# Patient Record
Sex: MALE | Race: WHITE | NOT HISPANIC OR LATINO | Employment: FULL TIME | URBAN - METROPOLITAN AREA
[De-identification: names, ages, dates, MRNs, and addresses within clinical notes are randomized per-mention and may not be internally consistent; named-entity substitution may affect disease eponyms.]

---

## 2017-01-31 DIAGNOSIS — I21.11 ST ELEVATION MYOCARDIAL INFARCTION INVOLVING RIGHT CORONARY ARTERY (H): Primary | ICD-10-CM

## 2017-01-31 RX ORDER — PRASUGREL 10 MG/1
10 TABLET, FILM COATED ORAL DAILY
Qty: 90 TABLET | Refills: 2 | Status: SHIPPED | OUTPATIENT
Start: 2017-01-31 | End: 2017-04-28

## 2017-03-02 DIAGNOSIS — I21.11 ST ELEVATION MYOCARDIAL INFARCTION INVOLVING RIGHT CORONARY ARTERY (H): ICD-10-CM

## 2017-03-02 DIAGNOSIS — I24.9 ACS (ACUTE CORONARY SYNDROME) (H): ICD-10-CM

## 2017-03-02 RX ORDER — METOPROLOL SUCCINATE 50 MG/1
50 TABLET, EXTENDED RELEASE ORAL DAILY
Qty: 90 TABLET | Refills: 3 | Status: SHIPPED | OUTPATIENT
Start: 2017-03-02 | End: 2017-04-28

## 2017-03-03 ENCOUNTER — TELEPHONE (OUTPATIENT)
Dept: CARDIOLOGY | Facility: CLINIC | Age: 52
End: 2017-03-03

## 2017-03-03 NOTE — TELEPHONE ENCOUNTER
Recieved call from  requesting lipids last lipids on record was from   December 2015  Left detailed msg most recent BMP and Lipids given 9-

## 2017-03-30 ENCOUNTER — TELEPHONE (OUTPATIENT)
Dept: CARDIOLOGY | Facility: CLINIC | Age: 52
End: 2017-03-30

## 2017-03-30 NOTE — TELEPHONE ENCOUNTER
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 Patient will call DOT to check what he needs and will call back to have orders set up. Patient states his angina is about the same, comes and goes.  Will update Dr. Palmer for approval to order DOT stress test per their protocol

## 2017-04-07 NOTE — TELEPHONE ENCOUNTER
"Spoke with patient, he did talk with the DOT but all he was told was that he needs \"something with an ejection fraction\". He will call again and try to get a more specific answer.  "

## 2017-04-10 ENCOUNTER — TELEPHONE (OUTPATIENT)
Dept: CARDIOLOGY | Facility: CLINIC | Age: 52
End: 2017-04-10

## 2017-04-10 DIAGNOSIS — I25.10 CORONARY ARTERY DISEASE INVOLVING NATIVE CORONARY ARTERY OF NATIVE HEART WITHOUT ANGINA PECTORIS: Primary | ICD-10-CM

## 2017-04-10 NOTE — TELEPHONE ENCOUNTER
Patient called, Per patient after speaking with DOT, he will need a stress echocardiogram. And then a cardiologist OV for DOT clearance.  Will message Dr. Palmer if BB hold needed prior to test?

## 2017-04-24 ENCOUNTER — TELEPHONE (OUTPATIENT)
Dept: CARDIOLOGY | Facility: CLINIC | Age: 52
End: 2017-04-24

## 2017-04-24 NOTE — TELEPHONE ENCOUNTER
Patient called to verify the stress test and appointment scheduled for this week. Appointment times and date verified. All questions answered. Patient verbalized understanding of test and appointment.,

## 2017-04-25 ENCOUNTER — HOSPITAL ENCOUNTER (OUTPATIENT)
Dept: CARDIOLOGY | Facility: CLINIC | Age: 52
Discharge: HOME | End: 2017-04-25
Attending: INTERNAL MEDICINE | Admitting: INTERNAL MEDICINE
Payer: COMMERCIAL

## 2017-04-25 DIAGNOSIS — I25.10 CORONARY ARTERY DISEASE INVOLVING NATIVE CORONARY ARTERY OF NATIVE HEART WITHOUT ANGINA PECTORIS: Chronic | ICD-10-CM

## 2017-04-25 DIAGNOSIS — I25.10 CORONARY ARTERY DISEASE INVOLVING NATIVE CORONARY ARTERY OF NATIVE HEART WITHOUT ANGINA PECTORIS: ICD-10-CM

## 2017-04-25 LAB
ANION GAP SERPL CALCULATED.3IONS-SCNC: 5.2 MMOL/L (ref 6–17)
BUN SERPL-MCNC: 11 MG/DL (ref 7–30)
CALCIUM SERPL-MCNC: 9.4 MG/DL (ref 8.5–10.5)
CHLORIDE SERPL-SCNC: 100 MMOL/L (ref 98–107)
CHOLEST SERPL-MCNC: 139 MG/DL
CO2 SERPL-SCNC: 29 MMOL/L (ref 23–29)
CREAT SERPL-MCNC: 0.98 MG/DL (ref 0.7–1.3)
GFR SERPL CREATININE-BSD FRML MDRD: 80 ML/MIN/1.7M2
GLUCOSE SERPL-MCNC: 118 MG/DL (ref 70–105)
HDLC SERPL-MCNC: 30 MG/DL
LDLC SERPL CALC-MCNC: 63 MG/DL
NONHDLC SERPL-MCNC: 109 MG/DL
POTASSIUM SERPL-SCNC: 4.2 MMOL/L (ref 3.5–5.1)
SODIUM SERPL-SCNC: 139 MMOL/L (ref 133–144)
TRIGL SERPL-MCNC: 231 MG/DL

## 2017-04-25 PROCEDURE — 80048 BASIC METABOLIC PNL TOTAL CA: CPT | Performed by: INTERNAL MEDICINE

## 2017-04-25 PROCEDURE — 80061 LIPID PANEL: CPT | Performed by: INTERNAL MEDICINE

## 2017-04-25 PROCEDURE — 93018 CV STRESS TEST I&R ONLY: CPT | Performed by: INTERNAL MEDICINE

## 2017-04-25 PROCEDURE — 93016 CV STRESS TEST SUPVJ ONLY: CPT | Performed by: INTERNAL MEDICINE

## 2017-04-25 PROCEDURE — 25500064 ZZH RX 255 OP 636: Performed by: INTERNAL MEDICINE

## 2017-04-25 PROCEDURE — 93325 DOPPLER ECHO COLOR FLOW MAPG: CPT | Mod: 26 | Performed by: INTERNAL MEDICINE

## 2017-04-25 PROCEDURE — 40000264 ECHO STRESS TEST WITH LUMASON

## 2017-04-25 PROCEDURE — 36415 COLL VENOUS BLD VENIPUNCTURE: CPT | Performed by: INTERNAL MEDICINE

## 2017-04-25 PROCEDURE — 93350 STRESS TTE ONLY: CPT | Mod: 26 | Performed by: INTERNAL MEDICINE

## 2017-04-25 PROCEDURE — 93321 DOPPLER ECHO F-UP/LMTD STD: CPT | Mod: 26 | Performed by: INTERNAL MEDICINE

## 2017-04-25 RX ADMIN — SULFUR HEXAFLUORIDE 5 ML: KIT at 10:30

## 2017-04-28 ENCOUNTER — OFFICE VISIT (OUTPATIENT)
Dept: CARDIOLOGY | Facility: CLINIC | Age: 52
End: 2017-04-28
Attending: INTERNAL MEDICINE
Payer: COMMERCIAL

## 2017-04-28 ENCOUNTER — DOCUMENTATION ONLY (OUTPATIENT)
Dept: CARDIOLOGY | Facility: CLINIC | Age: 52
End: 2017-04-28

## 2017-04-28 VITALS
DIASTOLIC BLOOD PRESSURE: 82 MMHG | HEART RATE: 72 BPM | SYSTOLIC BLOOD PRESSURE: 124 MMHG | WEIGHT: 210 LBS | HEIGHT: 73 IN | BODY MASS INDEX: 27.83 KG/M2

## 2017-04-28 DIAGNOSIS — I21.02 ST ELEVATION MYOCARDIAL INFARCTION INVOLVING LEFT ANTERIOR DESCENDING (LAD) CORONARY ARTERY (H): ICD-10-CM

## 2017-04-28 DIAGNOSIS — I21.11 ST ELEVATION MYOCARDIAL INFARCTION INVOLVING RIGHT CORONARY ARTERY (H): ICD-10-CM

## 2017-04-28 DIAGNOSIS — E78.5 HYPERLIPIDEMIA WITH TARGET LDL LESS THAN 70: ICD-10-CM

## 2017-04-28 DIAGNOSIS — E78.2 MIXED HYPERLIPIDEMIA: Primary | Chronic | ICD-10-CM

## 2017-04-28 DIAGNOSIS — I24.9 ACS (ACUTE CORONARY SYNDROME) (H): ICD-10-CM

## 2017-04-28 DIAGNOSIS — I10 BENIGN ESSENTIAL HYPERTENSION: ICD-10-CM

## 2017-04-28 DIAGNOSIS — I25.10 CORONARY ARTERY DISEASE INVOLVING NATIVE CORONARY ARTERY OF NATIVE HEART WITHOUT ANGINA PECTORIS: Chronic | ICD-10-CM

## 2017-04-28 PROCEDURE — 99214 OFFICE O/P EST MOD 30 MIN: CPT | Performed by: INTERNAL MEDICINE

## 2017-04-28 RX ORDER — METOPROLOL SUCCINATE 50 MG/1
50 TABLET, EXTENDED RELEASE ORAL DAILY
Qty: 90 TABLET | Refills: 3 | Status: SHIPPED | OUTPATIENT
Start: 2017-04-28 | End: 2018-05-23

## 2017-04-28 RX ORDER — NITROGLYCERIN 0.4 MG/1
0.4 TABLET SUBLINGUAL EVERY 5 MIN PRN
Qty: 25 TABLET | Refills: 0 | Status: SHIPPED | OUTPATIENT
Start: 2017-04-28 | End: 2018-06-18

## 2017-04-28 RX ORDER — ATORVASTATIN CALCIUM 40 MG/1
40 TABLET, FILM COATED ORAL DAILY
Qty: 90 TABLET | Refills: 3 | Status: SHIPPED | OUTPATIENT
Start: 2017-04-28 | End: 2018-05-01

## 2017-04-28 NOTE — MR AVS SNAPSHOT
After Visit Summary   4/28/2017    Ander Doyle    MRN: [id number]           Patient Information     Date Of Birth          1965        Visit Information        Provider Department      4/28/2017 11:00 AM Pawel Moreno MD [facility]        Today's Diagnoses     Mixed hyperlipidemia    -  1    Coronary artery disease involving native coronary artery of native heart without angina pectoris        ST elevation myocardial infarction involving left anterior descending (LAD) coronary artery (H)        Benign essential hypertension        ACS (acute coronary syndrome) (H)        ST elevation myocardial infarction involving right coronary artery (H)        Hyperlipidemia with target LDL less than 70           Follow-ups after your visit        Additional Services     Follow-Up with Cardiologist                 Future tests that were ordered for you today     Open Future Orders        Priority Expected Expires Ordered    Follow-Up with Cardiologist Routine 4/28/2018 9/10/2018 4/28/2017            Who to contact     If you have questions or need follow up information about today's clinic visit or your schedule please contact [facility] directly at [phone].  Normal or non-critical lab and imaging results will be communicated to you by eMazeMehart, letter or phone within 4 business days after the clinic has received the results. If you do not hear from us within 7 days, please contact the clinic through [employer] or phone. If you have a critical or abnormal lab result, we will notify you by phone as soon as possible.  Submit refill requests through Picanova or call your pharmacy and they will forward the refill request to us. Please allow 3 business days for your refill to be completed.          Additional Information About Your Visit        Picanova Information     Picanova lets you send messages to your doctor, view  "your test results, renew your prescriptions, schedule appointments and more. To sign up, go to [email or web address] . Click on \"Log in\" on the left side of the screen, which will take you to the Welcome page. Then click on \"Sign up Now\" on the right side of the page.     You will be asked to enter the access code listed below, as well as some personal information. Please follow the directions to create your username and password.     Your access code is: [email or web address]  Expires: 2017 11:47 AM     Your access code will  in 90 days. If you need help or a new code, please call your [facility] clinic or [phone].        Care EveryWhere ID     This is your Care EveryWhere ID. This could be used by other organizations to access your [facility] medical records  [id number]        Your Vitals Were     Pulse Height BMI (Body Mass Index)             72 1.854 m (6' 0.99\") 27.71 kg/m2          Blood Pressure from Last 3 Encounters:   17 124/82   09/15/16 112/64   16 110/80    Weight from Last 3 Encounters:   17 95.3 kg (210 lb)   09/15/16 97.1 kg (214 lb)   16 94.3 kg (208 lb)              We Performed the Following     Follow-Up with Cardiologist          Today's Medication Changes          These changes are accurate as of: 17 11:47 AM.  If you have any questions, ask your nurse or doctor.               Stop taking these medicines if you haven't already. Please contact your care team if you have questions.     prasugrel 10 MG Tabs tablet   Commonly known as:  EFFIENT   Stopped by:  Pawel Moreno MD                Where to get your medicines      These medications were sent to [facility]/pharmacy [address] [place], [place] - [address], [place] [address]     Phone:  [phone]     atorvastatin 40 MG tablet    metoprolol 50 MG 24 hr tablet    nitroglycerin 0.4 MG sublingual tablet                Primary Care Provider Office Phone # Fax #    Jose Yip MD " [phone]       [facility] [address]  [facility] [address]        Thank you!     Thank you for choosing [facility] [place]  for your care. Our goal is always to provide you with excellent care. Hearing back from our patients is one way we can continue to improve our services. Please take a few minutes to complete the written survey that you may receive in the mail after your visit with us. Thank you!             Your Updated Medication List - Protect others around you: Learn how to safely use, store and throw away your medicines at www.disposemymeds.org.          This list is accurate as of: 4/28/17 11:47 AM.  Always use your most recent med list.                   Brand Name Dispense Instructions for use    aspirin 81 MG chewable tablet     36 tablet    Take 1 tablet (81 mg) by mouth daily       atorvastatin 40 MG tablet    LIPITOR    90 tablet    Take 1 tablet (40 mg) by mouth daily       metoprolol 50 MG 24 hr tablet    TOPROL-XL    90 tablet    Take 1 tablet (50 mg) by mouth daily       nitroglycerin 0.4 MG sublingual tablet    NITROSTAT    25 tablet    Place 1 tablet (0.4 mg) under the tongue every 5 minutes as needed for chest pain       PANTOPRAZOLE SODIUM PO      Take by mouth daily       sucralfate 1 GM tablet    CARAFATE     Take by mouth 4 times daily as needed

## 2017-04-28 NOTE — PROGRESS NOTES
03/2015 when he presented with an inferior wall myocardial infarction. At that time, he had been smoking. 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 He has a history of hypertension, hypercholesterolemia, HDL deficiency, marked hypertriglyceridemia and borderline diabetes mellitus. In the Cath Lab, he was found to have severe disease involving his right coronary artery, circumflex and LAD artery. Aspiration thrombectomy and stenting of his right coronary artery was performed with distal embolism, a great deal of clot was removed, but there was some residual apical thrombus in one of his posterior lateral branches. Initially he had no reflow phenomenon which successfully responded to medications. He subsequently underwent circumflex coronary artery and OM treatment. He has a residual 80% mid LAD stenosis and an 80% first diagonal which was supposed to be staged and treated in a staged fashion; however, that night despite being on Integrilin and Brilinta he developed stent thrombosis. He was taken back to the Cath Lab where his right coronary artery was further dilated and stented with good result. He still, however, while in the hospital, developed recurrent chest pain. He was brought back to the Cath Lab, thinking that his LAD was probably the culprit, however, his right coronary artery had again thrombosed. Ultimately, his LAD was decided to be intervened on and no further attempts at his right coronary artery were performed. He underwent successful stenting of his LAD and diagonal. He participated in cardiac rehab. He had occasional throat discomfort which has been treated medically.         HPI and Plan:   See dictation  I recommend the following treatment or medication changes DC Effient.    No orders of the defined types were placed in this encounter.    No orders of the defined types were placed in this encounter.    There are no discontinued medications.      Encounter Diagnosis   Name Primary?      Coronary artery disease involving native coronary artery of native heart without angina pectoris        CURRENT MEDICATIONS:  Current Outpatient Prescriptions   Medication Sig Dispense Refill     metoprolol (TOPROL-XL) 50 MG 24 hr tablet Take 1 tablet (50 mg) by mouth daily 90 tablet 3     prasugrel (EFFIENT) 10 MG TABS tablet Take 1 tablet (10 mg) by mouth daily 90 tablet 2     atorvastatin (LIPITOR) 40 MG tablet Take 1 tablet (40 mg) by mouth daily 90 tablet 3     sucralfate (CARAFATE) 1 GM tablet Take by mouth 4 times daily as needed       PANTOPRAZOLE SODIUM PO Take by mouth daily       aspirin 81 MG chewable tablet Take 1 tablet (81 mg) by mouth daily 36 tablet 0     nitroglycerin (NITROSTAT) 0.4 MG SL tablet Place 1 tablet (0.4 mg) under the tongue every 5 minutes as needed for chest pain 25 tablet 0       ALLERGIES   No Known Allergies    PAST MEDICAL HISTORY:  Past Medical History:   Diagnosis Date     Angina pectoris (H)      Benign essential hypertension      CAD (coronary artery disease) 3/2015    cardiac cath 3/10/15: SHARONDA to LAD, SHARONDA to 1st diagonal, cardiac cath 3/7/15: BMS x2 to RCA, cardiac cath 3/6/15: SHARONDA to RCA, SHARONDA to circumflex, SHARONDA to OM     Hyperlipidemia LDL goal <70      STEMI (ST elevation myocardial infarction) (H)     inferior March 2015       PAST SURGICAL HISTORY:  Past Surgical History:   Procedure Laterality Date     HEART CATH STENT COR W/WO PTCA  3/6/2015    aspiration thrombectomy and PCI with BMS in prox and mid RCA, 80% LAD, CFX stents patent (prox CFX and OM branch)     HEART CATH STENT COR W/WO PTCA  3/7/2015    SHARONDA 1st diagonal and SHARONDA mid LAD       FAMILY HISTORY:  Family History   Problem Relation Age of Onset     Arrhythmia Father        SOCIAL HISTORY:  Social History     Social History     Marital status: Single     Spouse name: N/A     Number of children: N/A     Years of education: N/A     Social History Main Topics     Smoking status: Former Smoker     Packs/day: 1.50  "    Years: 30.00     Types: Cigarettes     Smokeless tobacco: None      Comment: quit 3/6/15 -  occas. has one     Alcohol use 0.0 oz/week     0 Standard drinks or equivalent per week      Comment: rare     Drug use: No     Sexual activity: Not Asked     Other Topics Concern     Caffeine Concern No     1 can of pop daily     Sleep Concern No     sometimes     Stress Concern No     Weight Concern No     Special Diet No     more lean meats, more greens     Exercise No     Social History Narrative         Review of Systems:  Skin:  Negative       Eyes:  Negative      ENT:  Negative   come and goes ,   Respiratory:  Negative       Cardiovascular:    Positive for;chest pain (occasional discomfort - 'feels different every time') very seldom   Gastroenterology: Positive for heartburn stomach discomfort ,   Genitourinary:  Negative      Musculoskeletal:  Negative   both feet - feel cold and hurt ,   Neurologic:  Negative   tingling in feet  Psychiatric:  Negative   Job related sleep disturbances  Heme/Lymph/Imm:  Negative      Endocrine:  Negative  (sweating)      Physical Exam:  Vitals: /82  Pulse 72  Ht 1.854 m (6' 0.99\")  Wt 95.3 kg (210 lb)  BMI 27.71 kg/m2    Constitutional:  cooperative, alert and oriented, well developed, well nourished, in no acute distress        Skin:  warm and dry to the touch        Head:  normocephalic, no masses or lesions        Eyes:  pupils equal and round;conjunctivae and lids unremarkable;sclera white;no xanthalasma;no nystagmus        ENT:  no pallor or cyanosis, dentition good        Neck:  no carotid bruit;carotid pulses are full and equal bilaterally        Chest:  clear to auscultation          Cardiac: regular rhythm;normal S1 and S2;no murmurs, gallops or rubs detected;no S3 or S4                  Abdomen:  abdomen soft;no masses;non-tender        Vascular: pulses full and equal                                   2+ bilateral radial pulses distal to catheter insertion " site, quick capillary refill    Extremities and Back:  no edema;no spinal abnormalities noted;normal muscle strength and tone              Neurological:  affect appropriate, oriented to time, person and place;no gross motor deficits          Recent Lab Results:  LIPID RESULTS:  Lab Results   Component Value Date    CHOL 139 04/25/2017    HDL 30 (L) 04/25/2017    LDL 63 04/25/2017    TRIG 231 (H) 04/25/2017    CHOLHDLRATIO 4.2 07/15/2015       LIVER ENZYME RESULTS:  Lab Results   Component Value Date    ALT 43 04/22/2015       CBC RESULTS:  Lab Results   Component Value Date    WBC 10.4 06/19/2015    RBC 5.20 06/19/2015    HGB 16.1 06/19/2015    HCT 46.5 06/19/2015    MCV 89 06/19/2015    MCH 31.0 06/19/2015    MCHC 34.6 06/19/2015    RDW 13.6 06/19/2015     06/19/2015       BMP RESULTS:  Lab Results   Component Value Date     04/25/2017    POTASSIUM 4.2 04/25/2017    CHLORIDE 100 04/25/2017    CO2 29 04/25/2017    ANIONGAP 5.2 (L) 04/25/2017     (H) 04/25/2017    BUN 11 04/25/2017    CR 0.98 04/25/2017    GFRESTIMATED 80 04/25/2017    GFRESTBLACK >90 04/25/2017    VOLODYMYR 9.4 04/25/2017        A1C RESULTS:  No results found for: A1C    INR RESULTS:  Lab Results   Component Value Date    INR 0.95 03/06/2015           CC  Claudio Palmer MD  [facility]  [address]  [place] [address]

## 2017-04-28 NOTE — LETTER
2017    Jose Yip MD  [facility]   [address]  [facility] [address]    RE: Ander Doyle       Dear Colleague,    I had the pleasure of seeing Ander AMADOR Vivek in the [facility].    I met Ander Bonillaolph today, who is 51.  At age 49 he had onset and documentation of coronary artery disease.  He ended up having stents to his RCA, LAD, diagonal branch and obtuse marginal.  Serially he had 3 coronary angiograms, and his last stenting intervention of the diagonal branch and the LAD revealed that the RCA stent and coronary artery were repeatedly thrombotically occluded.  It was decided to treat the culprit vessels of the left circulation and leave the occluded RCA alone.  He had collaterals to the inferior wall.  He had a tiny inferior wall infarct by stress nuclear study.      His main symptoms of myocardial ischemia were sweating.  He did not have classic pressure, pain, tightness, etc.  In the past year, he denies any cardiovascular symptoms to suggest an ischemic equivalent.  He denies chest pain, palpitations, dizziness, shortness of breath, orthopnea or lower leg edema.      He has abstained from smoking, to his credit.      He has taken the medication program includin.  Metoprolol succinate 50 mg a day.   2.  Atorvastatin 40 mg at bedtime.   3.  Aspirin 81 mg a day.   4.  Effient 10 mg a day.  It has been 2 years since his last stent intervention.      SOCIAL HISTORY:  He is single.  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  He does not smoke.  He does not drink.  He is active.  He likes to golf.      ALLERGIES:  None.      FAMILY HISTORY:  Negative for premature coronary artery disease.      REVIEW OF SYSTEMS:  As outlined in Epic.   HEENT:  Negative.   CARDIAC:  Negative for chest pain, dyspnea on exertion, palpitations, etc.  He has a vague history of abdominal discomfort but it does not sound anginal.   Musculoskeletal, GI, ,  neurologic, endocrine, etc., are all negative.      PHYSICAL EXAMINATION:   GENERAL:  Well-developed, well-nourished male weighing 210 pounds.   VITAL SIGNS:  Blood pressure 120/80, heart rate 70.  He had no neck vein distention or bruits.   HEART:  Regular without gallop or murmur.   LUNGS:  Clear.   ABDOMEN:  Soft without organomegaly, mass or pain.   EXTREMITIES:  Show +2 pedal pulses and no edema.      Per his DOT requirements, a stress echo study was done 04/25/2017.  This study revealed a small inferior scar but no evidence of exercise-induced ischemia, and this would go along with his history of an occluded RCA with collaterals to the inferior wall.  He did not develop chest pain, and no EKG changes were reported.      IMPRESSION:   1.  Asymptomatic coronary artery disease.   2.  Post multiple-vessel stenting as noted above.   3.  Chronically occluded RCA, but no current angina.  This was associated with a small inferior scar but normal ejection fraction at rest otherwise.   4.  No signs of angina or heart failure.   5.  Negative stress echo study for ischemia.   6.  Treated hyperlipidemia.   7.  Former smoker, but he has abstained from that habit.   8.  Treated hypertension.      PLAN:  I stopped his Effient.  I continued his current program.  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  His LV function is well maintained.  He has no signs of cardiac symptoms, and a stress test is negative for ischemia.  This is all excellent.      He will follow up in a year with Dr. Palmer and primary care with Dr. Yip.     Again, thank you for allowing me to participate in the care of your patient.      Sincerely,    MOIZ GUERRA MD     [facility]    cc:   Claudio Palmer MD, [facility] at [address], [address]    [place]  [address]

## 2017-04-29 NOTE — PROGRESS NOTES
HISTORY OF PRESENT ILLNESS:  I met Ander Doyle today, who is 51.  At age 49 he had onset and documentation of coronary artery disease.  He ended up having stents to his RCA, LAD, diagonal branch and obtuse marginal.  Serially he had 3 coronary angiograms, and his last stenting intervention of the diagonal branch and the LAD revealed that the RCA stent and coronary artery were repeatedly thrombotically occluded.  It was decided to treat the culprit vessels of the left circulation and leave the occluded RCA alone.  He had collaterals to the inferior wall.  He had a tiny inferior wall infarct by stress nuclear study.      His main symptoms of myocardial ischemia were sweating.  He did not have classic pressure, pain, tightness, etc.  In the past year, he denies any cardiovascular symptoms to suggest an ischemic equivalent.  He denies chest pain, palpitations, dizziness, shortness of breath, orthopnea or lower leg edema.      He has abstained from smoking, to his credit.      He has taken the medication program includin.  Metoprolol succinate 50 mg a day.   2.  Atorvastatin 40 mg at bedtime.   3.  Aspirin 81 mg a day.   4.  Effient 10 mg a day.  It has been 2 years since his last stent intervention.      SOCIAL HISTORY:  He is single.  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  He does not smoke.  He does not drink.  He is active.  He likes to golf.      ALLERGIES:  None.      FAMILY HISTORY:  Negative for premature coronary artery disease.      REVIEW OF SYSTEMS:  As outlined in Epic.   HEENT:  Negative.   CARDIAC:  Negative for chest pain, dyspnea on exertion, palpitations, etc.  He has a vague history of abdominal discomfort but it does not sound anginal.   Musculoskeletal, GI, , neurologic, endocrine, etc., are all negative.      PHYSICAL EXAMINATION:   GENERAL:  Well-developed, well-nourished male weighing 210 pounds.   VITAL SIGNS:  Blood pressure 120/80, heart rate 70.  He had no neck vein distention or  bruits.   HEART:  Regular without gallop or murmur.   LUNGS:  Clear.   ABDOMEN:  Soft without organomegaly, mass or pain.   EXTREMITIES:  Show +2 pedal pulses and no edema.      Per his DOT requirements, a stress echo study was done 2017.  This study revealed a small inferior scar but no evidence of exercise-induced ischemia, and this would go along with his history of an occluded RCA with collaterals to the inferior wall.  He did not develop chest pain, and no EKG changes were reported.      IMPRESSION:   1.  Asymptomatic coronary artery disease.   2.  Post multiple-vessel stenting as noted above.   3.  Chronically occluded RCA, but no current angina.  This was associated with a small inferior scar but normal ejection fraction at rest otherwise.   4.  No signs of angina or heart failure.   5.  Negative stress echo study for ischemia.   6.  Treated hyperlipidemia.   7.  Former smoker, but he has abstained from that habit.   8.  Treated hypertension.      PLAN:  I stopped his Effient.  I continued his current program.  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  His LV function is well maintained.  He has no signs of cardiac symptoms, and a stress test is negative for ischemia.  This is all excellent.      He will follow up in a year with Dr. Palmer and primary care with Dr. Yip.      cc:   Jose Yip MD    [address]  [address]       Claudio Palmer MD, [facility] at [address], [address]    [place]  [address]         MOIZ GUERRA MD, [facility]             D: 2017 11:47   T: 2017 23:38   MT: REINA      Name:     ROB CARRILLO   MRN:      [id number]        Account:      [id number]   :      1965           Service Date: 2017      Document: [id number]

## 2017-06-01 ENCOUNTER — TELEPHONE (OUTPATIENT)
Dept: CARDIOLOGY | Facility: CLINIC | Age: 52
End: 2017-06-01

## 2017-06-01 NOTE — TELEPHONE ENCOUNTER
Message from  Caroline Bradshaw [phone] asking if effient has been discontinued. They will need a copy of the office note for their records.  Attempted to return call to  with update that patient was seen by Dr. Moreno on 4/28/17 and per his dictation effient was discontinued. Left message requested fax # to send the note as requested.

## 2017-08-01 ENCOUNTER — TELEPHONE (OUTPATIENT)
Dept: CARDIOLOGY | Facility: CLINIC | Age: 52
End: 2017-08-01

## 2017-08-01 NOTE — TELEPHONE ENCOUNTER
Okay to stop Effient. On my last clinic visit in March 2016 I recommended continuing for another six months and then discontinuing.

## 2017-08-01 NOTE — TELEPHONE ENCOUNTER
4- Dr. Moreno's note PLAN:  I stopped his Effient.  I continued his current program.  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  His LV function is well maintained.  He has no signs of cardiac symptoms, and a stress test is negative for ischemia.  This is all excellent.     Patient Called in today and needs a refill of Effient and at his last OV 4-  Dr. Moreno was going to check with Dr. Palmer if he agreed to stop Effient.  He has not heard back from anyone.  Last angiogram 3-  Will forward to Dr. Palmer for review

## 2017-08-02 NOTE — TELEPHONE ENCOUNTER
Pt calling to ask if he can stop Effient  Yes, pt can stop Effient per Dr. Palmer  Left message for pt.

## 2017-10-16 ENCOUNTER — TELEPHONE (OUTPATIENT)
Dept: CARDIOLOGY | Facility: CLINIC | Age: 52
End: 2017-10-16

## 2017-10-16 NOTE — TELEPHONE ENCOUNTER
Call from Caroline Bradshaw, , [phone], stating patient had refilled effient again and she is asking if this is still needed. Called back with update that last chart notes from 8/2/17 agree that patient can stop his effient. Caroline Bradshaw will talk with patient to clarify that he has not been seen at another facility or had a health change and will help patient understand he was to stop the effient in August.

## 2018-04-09 ENCOUNTER — PRE VISIT (OUTPATIENT)
Dept: CARDIOLOGY | Facility: CLINIC | Age: 53
End: 2018-04-09

## 2018-04-09 DIAGNOSIS — I25.10 CORONARY ARTERY DISEASE INVOLVING NATIVE CORONARY ARTERY OF NATIVE HEART WITHOUT ANGINA PECTORIS: Primary | ICD-10-CM

## 2018-04-09 NOTE — TELEPHONE ENCOUNTER
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 Patient states he will call the DOT office but does not think this is needed until next year.  Patient states he has not seen PCP for awhile, he would like to check his lipid panel at the [facility] site prior to his OV. Message to scheduling to contact patient this week.

## 2018-04-27 DIAGNOSIS — I25.10 CORONARY ARTERY DISEASE INVOLVING NATIVE CORONARY ARTERY OF NATIVE HEART WITHOUT ANGINA PECTORIS: ICD-10-CM

## 2018-04-27 LAB
CHOLEST SERPL-MCNC: 139 MG/DL
HDLC SERPL-MCNC: 27 MG/DL
LDLC SERPL CALC-MCNC: 45 MG/DL
NONHDLC SERPL-MCNC: 112 MG/DL
TRIGL SERPL-MCNC: 337 MG/DL

## 2018-04-27 PROCEDURE — 36415 COLL VENOUS BLD VENIPUNCTURE: CPT | Performed by: INTERNAL MEDICINE

## 2018-04-27 PROCEDURE — 80061 LIPID PANEL: CPT | Performed by: INTERNAL MEDICINE

## 2018-05-01 ENCOUNTER — OFFICE VISIT (OUTPATIENT)
Dept: CARDIOLOGY | Facility: CLINIC | Age: 53
End: 2018-05-01
Attending: INTERNAL MEDICINE
Payer: COMMERCIAL

## 2018-05-01 VITALS
DIASTOLIC BLOOD PRESSURE: 80 MMHG | HEART RATE: 70 BPM | HEIGHT: 73 IN | WEIGHT: 213 LBS | SYSTOLIC BLOOD PRESSURE: 110 MMHG | BODY MASS INDEX: 28.23 KG/M2

## 2018-05-01 DIAGNOSIS — E78.2 MIXED HYPERLIPIDEMIA: Chronic | ICD-10-CM

## 2018-05-01 DIAGNOSIS — I25.10 CORONARY ARTERY DISEASE INVOLVING NATIVE CORONARY ARTERY OF NATIVE HEART WITHOUT ANGINA PECTORIS: Chronic | ICD-10-CM

## 2018-05-01 DIAGNOSIS — R07.89 ATYPICAL CHEST PAIN: Primary | ICD-10-CM

## 2018-05-01 DIAGNOSIS — I10 BENIGN ESSENTIAL HYPERTENSION: ICD-10-CM

## 2018-05-01 DIAGNOSIS — E78.6 HDL DEFICIENCY: ICD-10-CM

## 2018-05-01 DIAGNOSIS — I73.9 CLAUDICATION (H): ICD-10-CM

## 2018-05-01 PROCEDURE — 99214 OFFICE O/P EST MOD 30 MIN: CPT | Performed by: INTERNAL MEDICINE

## 2018-05-01 RX ORDER — ROSUVASTATIN CALCIUM 40 MG/1
40 TABLET, COATED ORAL DAILY
Qty: 90 TABLET | Refills: 3 | Status: SHIPPED | OUTPATIENT
Start: 2018-05-01 | End: 2018-08-08

## 2018-05-01 NOTE — PROGRESS NOTES
Service Date: 05/01/2018      HISTORY OF PRESENT ILLNESS:  Mr. Doyle is a very nice 52-year-old gentleman who I first met in 03/2015 when he presented with an inferior wall myocardial infarction.  At that time he was  and smoking 1-1/2 packs of cigarettes per day.  He has hypertension, hypercholesterolemia, HDL deficiency, marked hypertriglyceridemia and diet-controlled diabetes mellitus.  I took him to the Cardiac Catheterization Lab where we performed angioplasty and stenting on a severely diseased right coronary artery.  He also had disease in his circumflex and left anterior descending artery.  He had a distal embolization of clot.  I performed aspiration thrombectomy and left him with some distal emboli in his posterolateral branches.  He initially had no-reflow phenomenon which responded to medications.  I then proceeded to intervene on his circumflex coronary artery and obtuse marginal.  This left him with an 80% mid LAD stenosis and 80% stenosis of the first diagonal with a plan to return in a staged fashion.      That night; however, despite being on Integrilin and Brilinta, he developed stent thrombosis and was taken back to the Cardiac Catheterization Lab by my partner, where his right coronaries were further dilated and additional stent was placed with apparent good result.  The patient continued to flounder in the hospital.  Thinking that his symptoms were due to his left anterior descending artery, we brought back to the Cath Lab where again his right coronary artery was found to be closed.        After review of the films and discussion amongst the interventionalists, we decided not to make any further attempts at opening his right coronary artery and proceeded to stenting of his left anterior descending artery and diagonal.  He continued to have atypical chest discomfort.  He also had problems with various medications.  He was short of breath on Brilinta, so we switched him to Effient.   He had problems with cold feet and lightheadedness.  We backed off on his beta blockers with which his symptoms appear to improve.      Osiel was last seen a year ago by my partner, Pawel Moreno.  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  He went 8 minutes and 15 seconds.  He had no symptoms.  There were no significant EKG changes.  He had occasional PVCs and echo appeared to demonstrate his old infarct with improvement in left ventricular function.      For all intents and purposes, he states he quit smoking cigarettes.  He states he still has an occasional cigarette if it is a snowy evening when he driving.  He states this calms his nerves, but otherwise he is not smoking at all.  He notes no side effects or problems with his current medical regimen.  He does continue to have an atypical chest pain that occurs at rest.  He has no exertional chest, arm, neck, jaw or shoulder discomfort.  He also describes a left buttock and hip discomfort that occurs when he is walking to his truck.  He states it does not occur all the time.  He wonders whether it is his hip or whether it is circulation issue.      He continues to have problems with cold feet.      ASSESSMENT AND PLAN:  I do not think Ander's chest pain is ischemic.  He has had this ever since his intervention.  Stress tests over the years have not appear to indicate any further ischemia and I would continue just to treat this medically.  I assume his right coronary artery is still closed and collateral dependent.      Blood pressure is well controlled at 110/80 with a pulse of 70.      Weight is 213 pounds, giving a body mass index of 28.2 and I told him he needs to watch his weight.      I have admonished him to stay off cigarettes altogether.      Fasting lipid profile remains atrocious.  Total cholesterol is 139, HDL is 27, LDL fortunately is 45.  However, triglycerides are 337.  Review of his sugars are high.  I wonder whether he is  full-blown diabetic.  I will switch his atorvastatin 40 mg to rosuvastatin 40 mg when his current atorvastatin runs out and we will recheck.  This should be better for his HDL and his triglycerides.  When he comes back, we will recheck a TSH.  It was normal in 2016 and will also check a hemoglobin A1c.  If significantly elevated, I will refer him back to his primary for formal address of his diabetes.  In talking to him, he loves to drink his Mountain Dew and I told him this is probably the source of high glucose as well as high triglycerides.  I emphasized the importance of a regular exercise regimen and watching the carbohydrates in his diet.  I have told him he is likely to become a full-blown diabetic.      I will follow up an ankle brachial index and a stress test to look if he has had a significant peripheral vascular disease.      I will consider backing off on his metoprolol and decreasing it to 25 mg daily to see if that helps with his cold feet.  I did not do it today, but I will have him do it when he comes back in 3 months for followup after his check and the change to rosuvastatin.         CHRISTIANO PADRON MD, formerly [facility]             D: 2018   T: 2018   MT: LIZ      Name:     ROB CARRILLO   MRN:      [id number]        Account:      [id number]   :      1965           Service Date: 2018      Document: [id number]

## 2018-05-01 NOTE — MR AVS SNAPSHOT
After Visit Summary   5/1/2018    Ander Doyle    MRN: [id number]           Patient Information     Date Of Birth          1965        Visit Information        Provider Department      5/1/2018 3:15 PM Claudio Palmer MD [facility]   [place]        Today's Diagnoses     Atypical chest pain    -  1    Coronary artery disease involving native coronary artery of native heart without angina pectoris        ST elevation myocardial infarction involving left anterior descending (LAD) coronary artery (H)        Mixed hyperlipidemia        Benign essential hypertension        ACS (acute coronary syndrome) (H)        ST elevation myocardial infarction involving right coronary artery (H)        Hyperlipidemia with target LDL less than 70        Claudication (H)           Follow-ups after your visit        Additional Services     Follow-Up with Cardiac Advanced Practice Provider           Follow-Up with Cardiac Advanced Practice Provider           Follow-Up with Cardiologist       BMP/flp/alt                  Future tests that were ordered for you today     Open Future Orders        Priority Expected Expires Ordered    TSH Routine 8/1/2018 5/1/2019 5/1/2018    Hemoglobin A1c Routine 8/1/2018 5/1/2019 5/1/2018    Lipid Profile Routine 7/30/2018 5/1/2019 5/1/2018    ALT Routine 7/30/2018 5/1/2019 5/1/2018    Follow-Up with Cardiac Advanced Practice Provider Routine 7/30/2018 5/1/2019 5/1/2018    Basic metabolic panel Routine 5/1/2019 5/2/2019 5/1/2018    Lipid Profile Routine 5/1/2019 5/1/2019 5/1/2018    ALT Routine 5/1/2019 5/1/2019 5/1/2018    Follow-Up with Cardiac Advanced Practice Provider Routine 5/1/2019 5/2/2019 5/1/2018    Exercise Stress Echocardiogram Routine 5/1/2019 5/2/2019 5/1/2018    Follow-Up with Cardiologist Routine 4/30/2020 5/20/2020 5/1/2018    US Low Ext Arterial Dop Seg Pres w Ex (SLIME with Exercise) Routine 5/8/2018 5/1/2019 5/1/2018           "  Who to contact     If you have questions or need follow up information about today's clinic visit or your schedule please contact [facility] directly at [phone].  Normal or non-critical lab and imaging results will be communicated to you by MyChart, letter or phone within 4 business days after the clinic has received the results. If you do not hear from us within 7 days, please contact the clinic through MyChart or phone. If you have a critical or abnormal lab result, we will notify you by phone as soon as possible.  Submit refill requests through [employer] or call your pharmacy and they will forward the refill request to us. Please allow 3 business days for your refill to be completed.          Additional Information About Your Visit        [facility] Information     [employer] lets you send messages to your doctor, view your test results, renew your prescriptions, schedule appointments and more. To sign up, go to [email or web address] . Click on \"Log in\" on the left side of the screen, which will take you to the Welcome page. Then click on \"Sign up Now\" on the right side of the page.     You will be asked to enter the access code listed below, as well as some personal information. Please follow the directions to create your username and password.     Your access code is: [email or web address]  Expires: 2018  3:59 PM     Your access code will  in 90 days. If you need help or a new code, please call your [place] [facility] clinic or [phone].        Care EveryWhere ID     This is your Care EveryWhere ID. This could be used by other organizations to access your [facility] medical records  [id number]        Your Vitals Were     Pulse Height BMI (Body Mass Index)             70 1.854 m (6' 1\") 28.1 kg/m2          Blood Pressure from Last 3 Encounters:   18 110/80   17 124/82   09/15/16 112/64    Weight from Last 3 Encounters:   18 96.6 kg (213 lb)   17 95.3 kg " (210 lb)   09/15/16 97.1 kg (214 lb)              We Performed the Following     Follow-Up with Cardiologist          Today's Medication Changes          These changes are accurate as of 5/1/18  3:59 PM.  If you have any questions, ask your nurse or doctor.               Start taking these medicines.        Dose/Directions    rosuvastatin 40 MG tablet   Commonly known as:  CRESTOR   Used for:  Coronary artery disease involving native coronary artery of native heart without angina pectoris   Started by:  Claudio Palmer MD        Dose:  40 mg   Take 1 tablet (40 mg) by mouth daily   Quantity:  90 tablet   Refills:  3         Stop taking these medicines if you haven't already. Please contact your care team if you have questions.     atorvastatin 40 MG tablet   Commonly known as:  LIPITOR   Stopped by:  Claudio Palmer MD           sucralfate 1 GM tablet   Commonly known as:  CARAFATE   Stopped by:  Claudio Palmer MD                Where to get your medicines      These medications were sent to [facility] [address] [place], [place] - [address], [place] [address]     Phone:  [phone]     rosuvastatin 40 MG tablet                Primary Care Provider Office Phone # Fax #    Jose Yip [phone]       [facility] [address] [address]        Equal Access to Services     [facility] AH: Hadii ethel keys hadshawno Somateo, waaxda luqadaha, qaybta kaalmada adeegyada, abril pressley . So [facility] [phone].    ATENCIÓN: Si habla español, tiene a segundo disposición servicios gratuitos de asistencia lingüística. Llame al [phone].    We comply with applicable federal civil rights laws and [place] laws. We do not discriminate on the basis of race, color, national origin, age, disability, sex, sexual orientation, or gender identity.            Thank you!     Thank you for choosing [facility]  [facility]  for your care. Our goal is always to provide you with excellent care. Hearing back from our patients is one way we can continue to improve our services. Please take a few minutes to complete the written survey that you may receive in the mail after your visit with us. Thank you!             Your Updated Medication List - Protect others around you: Learn how to safely use, store and throw away your medicines at www.disposemymeds.org.          This list is accurate as of 5/1/18  3:59 PM.  Always use your most recent med list.                   Brand Name Dispense Instructions for use Diagnosis    aspirin 81 MG chewable tablet     36 tablet    Take 1 tablet (81 mg) by mouth daily    STEMI (ST elevation myocardial infarction) (H)       metoprolol succinate 50 MG 24 hr tablet    TOPROL-XL    90 tablet    Take 1 tablet (50 mg) by mouth daily    ACS (acute coronary syndrome) (H), ST elevation myocardial infarction involving right coronary artery (H)       nitroGLYcerin 0.4 MG sublingual tablet    NITROSTAT    25 tablet    Place 1 tablet (0.4 mg) under the tongue every 5 minutes as needed for chest pain    ST elevation myocardial infarction involving left anterior descending (LAD) coronary artery (H)       PANTOPRAZOLE SODIUM PO      Take by mouth daily        rosuvastatin 40 MG tablet    CRESTOR    90 tablet    Take 1 tablet (40 mg) by mouth daily    Coronary artery disease involving native coronary artery of native heart without angina pectoris

## 2018-05-01 NOTE — LETTER
5/1/2018      Jose Yip MD  [facility] [address]  [facility] [address]      RE: Ander Bonillaolph       Dear Colleague,    I had the pleasure of seeing Ander Doyle in the [facility].    Service Date: 05/01/2018      HISTORY OF PRESENT ILLNESS:  Mr. Doyle is a very nice 52-year-old gentleman who I first met in 03/2015 when he presented with an inferior wall myocardial infarction.  At that time he was  and smoking 1-1/2 packs of cigarettes per day.  He has hypertension, hypercholesterolemia, HDL deficiency, marked hypertriglyceridemia and diet-controlled diabetes mellitus.  I took him to the Cardiac Catheterization Lab where we performed angioplasty and stenting on a severely diseased right coronary artery.  He also had disease in his circumflex and left anterior descending artery.  He had a distal embolization of clot.  I performed aspiration thrombectomy and left him with some distal emboli in his posterolateral branches.  He initially had no-reflow phenomenon which responded to medications.  I then proceeded to intervene on his circumflex coronary artery and obtuse marginal.  This left him with an 80% mid LAD stenosis and 80% stenosis of the first diagonal with a plan to return in a staged fashion.      That night; however, despite being on Integrilin and Brilinta, he developed stent thrombosis and was taken back to the Cardiac Catheterization Lab by my partner, where his right coronaries were further dilated and additional stent was placed with apparent good result.  The patient continued to flounder in the hospital.  Thinking that his symptoms were due to his left anterior descending artery, we brought back to the Cath Lab where again his right coronary artery was found to be closed.        After review of the films and discussion amongst the interventionalists, we decided not to make any further attempts at opening his right  coronary artery and proceeded to stenting of his left anterior descending artery and diagonal.  He continued to have atypical chest discomfort.  He also had problems with various medications.  He was short of breath on Brilinta, so we switched him to Effient.  He had problems with cold feet and lightheadedness.  We backed off on his beta blockers with which his symptoms appear to improve.      Osiel was last seen a year ago by my partner, Pawel Moreno.  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  He went 8 minutes and 15 seconds.  He had no symptoms.  There were no significant EKG changes.  He had occasional PVCs and echo appeared to demonstrate his old infarct with improvement in left ventricular function.      For all intents and purposes, he states he quit smoking cigarettes.  He states he still has an occasional cigarette if it is a snowy evening when he driving.  He states this calms his nerves, but otherwise he is not smoking at all.  He notes no side effects or problems with his current medical regimen.  He does continue to have an atypical chest pain that occurs at rest.  He has no exertional chest, arm, neck, jaw or shoulder discomfort.  He also describes a left buttock and hip discomfort that occurs when he is walking to his truck.  He states it does not occur all the time.  He wonders whether it is his hip or whether it is circulation issue.      He continues to have problems with cold feet.      ASSESSMENT AND PLAN:  I do not think Ander's chest pain is ischemic.  He has had this ever since his intervention.  Stress tests over the years have not appear to indicate any further ischemia and I would continue just to treat this medically.  I assume his right coronary artery is still closed and collateral dependent.      Blood pressure is well controlled at 110/80 with a pulse of 70.      Weight is 213 pounds, giving a body mass index of 28.2 and I told him he needs to watch his weight.      I  have admonished him to stay off cigarettes altogether.      Fasting lipid profile remains atrocious.  Total cholesterol is 139, HDL is 27, LDL fortunately is 45.  However, triglycerides are 337.  Review of his sugars are high.  I wonder whether he is full-blown diabetic.  I will switch his atorvastatin 40 mg to rosuvastatin 40 mg when his current atorvastatin runs out and we will recheck.  This should be better for his HDL and his triglycerides.  When he comes back, we will recheck a TSH.  It was normal in 2016 and will also check a hemoglobin A1c.  If significantly elevated, I will refer him back to his primary for formal address of his diabetes.  In talking to him, he loves to drink his Mountain Dew and I told him this is probably the source of high glucose as well as high triglycerides.  I emphasized the importance of a regular exercise regimen and watching the carbohydrates in his diet.  I have told him he is likely to become a full-blown diabetic.      I will follow up an ankle brachial index and a stress test to look if he has had a significant peripheral vascular disease.      I will consider backing off on his metoprolol and decreasing it to 25 mg daily to see if that helps with his cold feet.  I did not do it today, but I will have him do it when he comes back in 3 months for followup after his check and the change to rosuvastatin.         CHRISTIANO PADRON MD, [facility]             D: 2018   T: 2018   MT: LIZ      Name:     ROB CARRILLO   MRN:      [id number]        Account:      [id number]   :      1965           Service Date: 2018      Document: [id number]         Outpatient Encounter Prescriptions as of 2018   Medication Sig Dispense Refill     aspirin 81 MG chewable tablet Take 1 tablet (81 mg) by mouth daily 36 tablet 0     metoprolol (TOPROL-XL) 50 MG 24 hr tablet Take 1 tablet (50 mg) by mouth daily 90 tablet 3     nitroglycerin (NITROSTAT) 0.4 MG sublingual tablet  Place 1 tablet (0.4 mg) under the tongue every 5 minutes as needed for chest pain 25 tablet 0     PANTOPRAZOLE SODIUM PO Take by mouth daily       rosuvastatin (CRESTOR) 40 MG tablet Take 1 tablet (40 mg) by mouth daily 90 tablet 3     [DISCONTINUED] atorvastatin (LIPITOR) 40 MG tablet Take 1 tablet (40 mg) by mouth daily 90 tablet 3     [DISCONTINUED] sucralfate (CARAFATE) 1 GM tablet Take by mouth 4 times daily as needed       No facility-administered encounter medications on file as of 5/1/2018.        Again, thank you for allowing me to participate in the care of your patient.      Sincerely,    Claudio Palmer MD     [facility]

## 2018-05-01 NOTE — LETTER
5/1/2018    Jose Yip MD  [facility] [address]  [facility] [address]    RE: Ander Doyle       Dear Colleague,    I had the pleasure of seeing Ander Doyle in the [facility].    HPI and Plan:   See dictation    Orders Placed This Encounter   Procedures     US Low Ext Arterial Dop Seg Pres w Ex (SLIME with Exercise)     Basic metabolic panel     Lipid Profile     ALT     Lipid Profile     ALT     Hemoglobin A1c     TSH     Follow-Up with Cardiac Advanced Practice Provider     Follow-Up with Cardiologist     Follow-Up with Cardiac Advanced Practice Provider     Exercise Stress Echocardiogram       Orders Placed This Encounter   Medications     rosuvastatin (CRESTOR) 40 MG tablet     Sig: Take 1 tablet (40 mg) by mouth daily     Dispense:  90 tablet     Refill:  3       Medications Discontinued During This Encounter   Medication Reason     sucralfate (CARAFATE) 1 GM tablet      atorvastatin (LIPITOR) 40 MG tablet          Encounter Diagnoses   Name Primary?     Coronary artery disease involving native coronary artery of native heart without angina pectoris      Mixed hyperlipidemia      Benign essential hypertension      Atypical chest pain Yes     Claudication (H)      HDL deficiency        CURRENT MEDICATIONS:  Current Outpatient Prescriptions   Medication Sig Dispense Refill     aspirin 81 MG chewable tablet Take 1 tablet (81 mg) by mouth daily 36 tablet 0     metoprolol (TOPROL-XL) 50 MG 24 hr tablet Take 1 tablet (50 mg) by mouth daily 90 tablet 3     nitroglycerin (NITROSTAT) 0.4 MG sublingual tablet Place 1 tablet (0.4 mg) under the tongue every 5 minutes as needed for chest pain 25 tablet 0     PANTOPRAZOLE SODIUM PO Take by mouth daily       rosuvastatin (CRESTOR) 40 MG tablet Take 1 tablet (40 mg) by mouth daily 90 tablet 3       ALLERGIES   No Known Allergies    PAST MEDICAL HISTORY:  Past Medical History:   Diagnosis Date     Angina  pectoris (H)      Benign essential hypertension      CAD (coronary artery disease) 3/2015    cardiac cath 3/10/15: SHARONDA to LAD, SHARONDA to 1st diagonal, cardiac cath 3/7/15: BMS x2 to RCA, cardiac cath 3/6/15: SHARONDA to RCA, SHARONDA to circumflex, SHARONDA to OM     Hyperlipidemia LDL goal <70      STEMI (ST elevation myocardial infarction) (H)     inferior March 2015       PAST SURGICAL HISTORY:  Past Surgical History:   Procedure Laterality Date     HEART CATH STENT COR W/WO PTCA  3/6/2015    aspiration thrombectomy and PCI with BMS in prox and mid RCA, 80% LAD, CFX stents patent (prox CFX and OM branch)     HEART CATH STENT COR W/WO PTCA  3/7/2015    SHARONDA 1st diagonal and SHARONDA mid LAD       FAMILY HISTORY:  Family History   Problem Relation Age of Onset     Arrhythmia Father        SOCIAL HISTORY:  Social History     Social History     Marital status: Single     Spouse name: N/A     Number of children: N/A     Years of education: N/A     Social History Main Topics     Smoking status: Former Smoker     Packs/day: 1.50     Years: 30.00     Types: Cigarettes     Smokeless tobacco: Never Used      Comment: quit 3/6/15 -  occas. has one     Alcohol use 0.0 oz/week     0 Standard drinks or equivalent per week      Comment: rare     Drug use: No     Sexual activity: Not Asked     Other Topics Concern     Caffeine Concern No     1 can of pop daily     Sleep Concern No     sometimes     Stress Concern No     Weight Concern No     Special Diet No     more lean meats, more greens     Exercise No     Social History Narrative       Review of Systems:  Skin:  Negative       Eyes:  Negative      ENT:  Negative      Respiratory:  Positive for cough getting over a cold   Cardiovascular:    Positive for;chest pain (occasional discomfort - 'feels different every time') very seldom   Gastroenterology: Negative      Genitourinary:  Negative      Musculoskeletal:  Negative      Neurologic:  Positive for numbness or tingling of feet    Psychiatric:   "Negative      Heme/Lymph/Imm:  Negative      Endocrine:  Negative  (sweating)      Physical Exam:  Vitals: /80  Pulse 70  Ht 1.854 m (6' 1\")  Wt 96.6 kg (213 lb)  BMI 28.1 kg/m2    Constitutional:  cooperative, alert and oriented, well developed, well nourished, in no acute distress        Skin:  warm and dry to the touch, no apparent skin lesions or masses noted          Head:  normocephalic, no masses or lesions        Eyes:  pupils equal and round, conjunctivae and lids unremarkable, sclera white, no xanthalasma, EOMS intact, no nystagmus        Lymph:      ENT:  no pallor or cyanosis, dentition good        Neck:  no carotid bruit;carotid pulses are full and equal bilaterally        Respiratory:  normal breath sounds, clear to auscultation, normal A-P diameter, normal symmetry, normal respiratory excursion, no use of accessory muscles         Cardiac: regular rhythm;normal S1 and S2;no murmurs, gallops or rubs detected;no S3 or S4                pulses full and equal                                   2+ bilateral radial pulses distal to catheter insertion site, quick capillary refill    GI:           Extremities and Muscular Skeletal:  no edema;no spinal abnormalities noted;normal muscle strength and tone              Neurological:  no gross motor deficits        Psych:  affect appropriate, oriented to time, person and place        CC  Pawel Moreno MD  [address]  [place] [address]                Thank you for allowing me to participate in the care of your patient.      Sincerely,     Claudio Palmer MD     [facility]    cc:   Pawel Moreno MD  [address]  [place] [address]        "

## 2018-05-01 NOTE — PROGRESS NOTES
Detail Level: Zone HPI and Plan:   See dictation    Orders Placed This Encounter   Procedures     US Low Ext Arterial Dop Seg Pres w Ex (SLIME with Exercise)     Basic metabolic panel     Lipid Profile     ALT     Lipid Profile     ALT     Hemoglobin A1c     TSH     Follow-Up with Cardiac Advanced Practice Provider     Follow-Up with Cardiologist     Follow-Up with Cardiac Advanced Practice Provider     Exercise Stress Echocardiogram       Orders Placed This Encounter   Medications     rosuvastatin (CRESTOR) 40 MG tablet     Sig: Take 1 tablet (40 mg) by mouth daily     Dispense:  90 tablet     Refill:  3       Medications Discontinued During This Encounter   Medication Reason     sucralfate (CARAFATE) 1 GM tablet      atorvastatin (LIPITOR) 40 MG tablet          Encounter Diagnoses   Name Primary?     Coronary artery disease involving native coronary artery of native heart without angina pectoris      Mixed hyperlipidemia      Benign essential hypertension      Atypical chest pain Yes     Claudication (H)      HDL deficiency        CURRENT MEDICATIONS:  Current Outpatient Prescriptions   Medication Sig Dispense Refill     aspirin 81 MG chewable tablet Take 1 tablet (81 mg) by mouth daily 36 tablet 0     metoprolol (TOPROL-XL) 50 MG 24 hr tablet Take 1 tablet (50 mg) by mouth daily 90 tablet 3     nitroglycerin (NITROSTAT) 0.4 MG sublingual tablet Place 1 tablet (0.4 mg) under the tongue every 5 minutes as needed for chest pain 25 tablet 0     PANTOPRAZOLE SODIUM PO Take by mouth daily       rosuvastatin (CRESTOR) 40 MG tablet Take 1 tablet (40 mg) by mouth daily 90 tablet 3       ALLERGIES   No Known Allergies    PAST MEDICAL HISTORY:  Past Medical History:   Diagnosis Date     Angina pectoris (H)      Benign essential hypertension      CAD (coronary artery disease) 3/2015    cardiac cath 3/10/15: SHARONDA to LAD, SHARONDA to 1st diagonal, cardiac cath 3/7/15: BMS x2 to RCA, cardiac cath 3/6/15: SHARONDA to RCA, SHARONDA to circumflex, SHARONDA to OM      "Hyperlipidemia LDL goal <70      STEMI (ST elevation myocardial infarction) (H)     inferior March 2015       PAST SURGICAL HISTORY:  Past Surgical History:   Procedure Laterality Date     HEART CATH STENT COR W/WO PTCA  3/6/2015    aspiration thrombectomy and PCI with BMS in prox and mid RCA, 80% LAD, CFX stents patent (prox CFX and OM branch)     HEART CATH STENT COR W/WO PTCA  3/7/2015    SHARONDA 1st diagonal and SHARONDA mid LAD       FAMILY HISTORY:  Family History   Problem Relation Age of Onset     Arrhythmia Father        SOCIAL HISTORY:  Social History     Social History     Marital status: Single     Spouse name: N/A     Number of children: N/A     Years of education: N/A     Social History Main Topics     Smoking status: Former Smoker     Packs/day: 1.50     Years: 30.00     Types: Cigarettes     Smokeless tobacco: Never Used      Comment: quit 3/6/15 -  occas. has one     Alcohol use 0.0 oz/week     0 Standard drinks or equivalent per week      Comment: rare     Drug use: No     Sexual activity: Not Asked     Other Topics Concern     Caffeine Concern No     1 can of pop daily     Sleep Concern No     sometimes     Stress Concern No     Weight Concern No     Special Diet No     more lean meats, more greens     Exercise No     Social History Narrative       Review of Systems:  Skin:  Negative       Eyes:  Negative      ENT:  Negative      Respiratory:  Positive for cough getting over a cold   Cardiovascular:    Positive for;chest pain (occasional discomfort - 'feels different every time') very seldom   Gastroenterology: Negative      Genitourinary:  Negative      Musculoskeletal:  Negative      Neurologic:  Positive for numbness or tingling of feet    Psychiatric:  Negative      Heme/Lymph/Imm:  Negative      Endocrine:  Negative  (sweating)      Physical Exam:  Vitals: /80  Pulse 70  Ht 1.854 m (6' 1\")  Wt 96.6 kg (213 lb)  BMI 28.1 kg/m2    Constitutional:  cooperative, alert and oriented, well " developed, well nourished, in no acute distress        Skin:  warm and dry to the touch, no apparent skin lesions or masses noted          Head:  normocephalic, no masses or lesions        Eyes:  pupils equal and round, conjunctivae and lids unremarkable, sclera white, no xanthalasma, EOMS intact, no nystagmus        Lymph:      ENT:  no pallor or cyanosis, dentition good        Neck:  no carotid bruit;carotid pulses are full and equal bilaterally        Respiratory:  normal breath sounds, clear to auscultation, normal A-P diameter, normal symmetry, normal respiratory excursion, no use of accessory muscles         Cardiac: regular rhythm;normal S1 and S2;no murmurs, gallops or rubs detected;no S3 or S4                pulses full and equal                                   2+ bilateral radial pulses distal to catheter insertion site, quick capillary refill    GI:           Extremities and Muscular Skeletal:  no edema;no spinal abnormalities noted;normal muscle strength and tone              Neurological:  no gross motor deficits        Psych:  affect appropriate, oriented to time, person and place        CC  Pawel Moreno MD  [address]  [place] [id number]

## 2018-05-22 ENCOUNTER — HOSPITAL ENCOUNTER (OUTPATIENT)
Dept: ULTRASOUND IMAGING | Facility: CLINIC | Age: 53
Discharge: HOME | End: 2018-05-22
Attending: INTERNAL MEDICINE | Admitting: INTERNAL MEDICINE
Payer: COMMERCIAL

## 2018-05-22 DIAGNOSIS — I73.9 CLAUDICATION (H): ICD-10-CM

## 2018-05-22 DIAGNOSIS — I25.10 CORONARY ARTERY DISEASE INVOLVING NATIVE CORONARY ARTERY OF NATIVE HEART WITHOUT ANGINA PECTORIS: Chronic | ICD-10-CM

## 2018-05-22 PROCEDURE — 93924 LWR XTR VASC STDY BILAT: CPT | Mod: 26 | Performed by: INTERNAL MEDICINE

## 2018-05-22 PROCEDURE — 93924 LWR XTR VASC STDY BILAT: CPT

## 2018-05-23 ENCOUNTER — TELEPHONE (OUTPATIENT)
Dept: CARDIOLOGY | Facility: CLINIC | Age: 53
End: 2018-05-23

## 2018-05-23 DIAGNOSIS — I21.11 ST ELEVATION MYOCARDIAL INFARCTION INVOLVING RIGHT CORONARY ARTERY (H): ICD-10-CM

## 2018-05-23 DIAGNOSIS — I73.9 PAD (PERIPHERAL ARTERY DISEASE) (H): Primary | ICD-10-CM

## 2018-05-23 DIAGNOSIS — I24.9 ACS (ACUTE CORONARY SYNDROME) (H): ICD-10-CM

## 2018-05-23 RX ORDER — METOPROLOL SUCCINATE 50 MG/1
50 TABLET, EXTENDED RELEASE ORAL DAILY
Qty: 90 TABLET | Refills: 0 | Status: SHIPPED | OUTPATIENT
Start: 2018-05-23 | End: 2018-08-08

## 2018-05-23 NOTE — TELEPHONE ENCOUNTER
SLIME's 5-23-18 - ordered by Dr. Palmer at OV 5-1-18 ordered ankle brachial index and a stress test to look if he has had a significant peripheral vascular disease.  Normal RIGHT rest and exercise SLIME suggests no hemodynamically  significant obstructive PAD in right lower extremity.  Borderline reduced LEFT rest (0.92), left leg symptoms with exertion  and equivocal response to exercise (post SLIME 0.85) could be consistent  with obstructive PAD in left lower extremity.  Consider LLE arterial duplex ultrasound for further evaluation if  clinically warranted.   [place]: Dr. Hernández   Next [facility] OV 8-1-18

## 2018-05-24 NOTE — TELEPHONE ENCOUNTER
Per Dr. Palmer's review of SLIME with borderline LLE PAD and recommendation for further imaging, order placed for left lower extremity arterial duplex US. Attempted to contact patient to review and schedule US, left message for patient to call back.

## 2018-05-29 NOTE — TELEPHONE ENCOUNTER
Spoke with patient. Reviewed SLIME results and Dr. Palmer's recommendation to set up left lower extremity arterial duplex US. Patient agrees to plan. Transferred to scheduling to set up US.

## 2018-06-05 ENCOUNTER — TELEPHONE (OUTPATIENT)
Dept: CARDIOLOGY | Facility: CLINIC | Age: 53
End: 2018-06-05

## 2018-06-05 ENCOUNTER — HOSPITAL ENCOUNTER (OUTPATIENT)
Dept: ULTRASOUND IMAGING | Facility: CLINIC | Age: 53
Discharge: HOME | End: 2018-06-05
Attending: INTERNAL MEDICINE | Admitting: INTERNAL MEDICINE
Payer: COMMERCIAL

## 2018-06-05 DIAGNOSIS — I73.9 PAD (PERIPHERAL ARTERY DISEASE) (H): ICD-10-CM

## 2018-06-05 PROCEDURE — 93926 LOWER EXTREMITY STUDY: CPT | Mod: LT

## 2018-06-05 PROCEDURE — 93926 LOWER EXTREMITY STUDY: CPT | Mod: LT | Performed by: INTERNAL MEDICINE

## 2018-06-05 NOTE — TELEPHONE ENCOUNTER
Attempted to contact patient with results of US lower extremity 6/5/18, read as no stenosis.  Ordered to f/u borderline SLIME report  Left message for patient to call back

## 2018-06-05 NOTE — TELEPHONE ENCOUNTER
Left Lower extremity arterial US 6-5-18. Ordered per phone note 5-23-18.   Results:  Left lower extremity  1. The flow velocities in the arteries of the left lower extremity are  within the usual range indicating no significant diameter stenosis  greater than 50%.   ** Patient had SLIME's done May 23, 2018 - SLIME with borderline LLE PAD and recommendation for further imaging.   Next OV 8-1-18 with [facility].   Will message Dr. Palmer.

## 2018-06-07 NOTE — TELEPHONE ENCOUNTER
Patient called with SLIME results and no stenosis.   Patient reminded of upcoming [facility] OV August 2018.

## 2018-06-18 ENCOUNTER — TELEPHONE (OUTPATIENT)
Dept: CARDIOLOGY | Facility: CLINIC | Age: 53
End: 2018-06-18

## 2018-06-18 DIAGNOSIS — I21.02 ST ELEVATION MYOCARDIAL INFARCTION INVOLVING LEFT ANTERIOR DESCENDING (LAD) CORONARY ARTERY (H): ICD-10-CM

## 2018-06-18 RX ORDER — NITROGLYCERIN 0.4 MG/1
TABLET SUBLINGUAL
Qty: 25 TABLET | Refills: 0 | Status: SHIPPED | OUTPATIENT
Start: 2018-06-18 | End: 2018-06-18

## 2018-06-18 RX ORDER — NITROGLYCERIN 0.4 MG/1
TABLET SUBLINGUAL
Qty: 25 TABLET | Refills: 0 | Status: SHIPPED | OUTPATIENT
Start: 2018-06-18 | End: 2020-09-03

## 2018-08-08 ENCOUNTER — OFFICE VISIT (OUTPATIENT)
Dept: CARDIOLOGY | Facility: CLINIC | Age: 53
End: 2018-08-08
Payer: COMMERCIAL

## 2018-08-08 VITALS
HEIGHT: 73 IN | WEIGHT: 212.5 LBS | BODY MASS INDEX: 28.16 KG/M2 | SYSTOLIC BLOOD PRESSURE: 120 MMHG | HEART RATE: 68 BPM | DIASTOLIC BLOOD PRESSURE: 80 MMHG

## 2018-08-08 DIAGNOSIS — I25.10 CORONARY ARTERY DISEASE INVOLVING NATIVE CORONARY ARTERY OF NATIVE HEART WITHOUT ANGINA PECTORIS: Chronic | ICD-10-CM

## 2018-08-08 DIAGNOSIS — E78.2 MIXED HYPERLIPIDEMIA: Chronic | ICD-10-CM

## 2018-08-08 DIAGNOSIS — I73.9 PAD (PERIPHERAL ARTERY DISEASE) (H): Primary | ICD-10-CM

## 2018-08-08 DIAGNOSIS — I24.9 ACS (ACUTE CORONARY SYNDROME) (H): ICD-10-CM

## 2018-08-08 DIAGNOSIS — I21.11 ST ELEVATION MYOCARDIAL INFARCTION INVOLVING RIGHT CORONARY ARTERY (H): ICD-10-CM

## 2018-08-08 LAB
ALT SERPL W P-5'-P-CCNC: 13 U/L (ref 5–30)
CHOLEST SERPL-MCNC: 105 MG/DL
HBA1C MFR BLD: 8.4 % (ref 0–5.6)
HDLC SERPL-MCNC: 30 MG/DL
LDLC SERPL CALC-MCNC: 36 MG/DL
NONHDLC SERPL-MCNC: 75 MG/DL
TRIGL SERPL-MCNC: 196 MG/DL
TSH SERPL DL<=0.005 MIU/L-ACNC: 1.76 MU/L (ref 0.4–4)

## 2018-08-08 PROCEDURE — 36415 COLL VENOUS BLD VENIPUNCTURE: CPT | Performed by: INTERNAL MEDICINE

## 2018-08-08 PROCEDURE — 83036 HEMOGLOBIN GLYCOSYLATED A1C: CPT | Performed by: INTERNAL MEDICINE

## 2018-08-08 PROCEDURE — 80061 LIPID PANEL: CPT | Performed by: INTERNAL MEDICINE

## 2018-08-08 PROCEDURE — 99215 OFFICE O/P EST HI 40 MIN: CPT | Performed by: NURSE PRACTITIONER

## 2018-08-08 PROCEDURE — 84460 ALANINE AMINO (ALT) (SGPT): CPT | Performed by: INTERNAL MEDICINE

## 2018-08-08 PROCEDURE — 84443 ASSAY THYROID STIM HORMONE: CPT | Performed by: INTERNAL MEDICINE

## 2018-08-08 RX ORDER — ROSUVASTATIN CALCIUM 40 MG/1
40 TABLET, COATED ORAL DAILY
Qty: 90 TABLET | Refills: 3 | Status: SHIPPED | OUTPATIENT
Start: 2018-08-08 | End: 2019-09-09

## 2018-08-08 RX ORDER — METOPROLOL SUCCINATE 50 MG/1
50 TABLET, EXTENDED RELEASE ORAL DAILY
Qty: 90 TABLET | Refills: 3 | Status: SHIPPED | OUTPATIENT
Start: 2018-08-08 | End: 2019-02-22

## 2018-08-08 NOTE — LETTER
8/8/2018    Jose Yip MD  [facility] [address]  [facility] [address]    RE: Seun Rudolph       Dear Colleague,    I had the pleasure of seeing Ander Doyle in the [facility].    HPI and Plan:   I had the pleasure of seeing Ander Doyle today in cardiology clinic follow up . He is a pleasant 52 year old patient of Dr. Palmer with an extensive history of coronary artery disease.    In March 2015 he  presented with a inferior wall myocardial infarction.  He was hypertensive, he had hypercholesterolemia, HDL deficiency and marked hypertriglyceridemia.  He tells me today that he did not know he was prediabetes.  He is a smoker, though he has reduced his cigarette smoking over time.  He has stenting in the severely diseased right coronary artery, circumflex and OM, mid LAD and first diagonal.  He did have complications after his first angiogram, details are available and Dr. Palmer's note.    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  He had occasional PVCs and the echo appeared to demonstrate his old infarct with improved LV function.    He saw Dr. Palmer in May, at that time his cholesterol panel was pretty bad with suspicion for diabetes with triglycerides 337.  His atorvastatin was switched to rosuvastatin and today his labs are somewhat improved.  In addition to this a hemoglobin A1c was elevated at 8.4, his TSH was normal.  He complained of hip discomfort that have been ongoing for the last few months with buttocks pain and pain in the back of his legs.  He had exercise ABIs done, his right SLIME was essentially normal but the left was questionable, at rest it was 0.92, post exercise it was 0.85 and he was symptomatic during the test.  He went on to have a left lower extremity ultrasound which had flow velocities in the left lower extremity  within the usual range and no significant diameter stenosis greater than 50%, he had a normal triphasic pattern.    Today he says he can walk 200-300 feet before he gets symptoms, also going up stairs causes him quite a lot of symptoms.  He describes hip pain that wraps around to his inner thigh.  He has good PD pulses    Labs Reviewed: Hemoglobin A1c 8.4, cholesterol 105, HDL 30, LDL 36, triglycerides 196, creatinine 0.98    Physical Exam  Please see Below     Assessment and Plan  1.   Coronary artery disease.  He is not having any ischemic symptoms. His medications are optimized, he is on statin, aspirin and beta blockade.   2.  Hypertriglyceridemia.  Since switching from atorvastatin 40 to Crestor 40 he has had improvement in his triglycerides.  His hemoglobin A1c is significantly elevated and I have asked him to meet with his primary care provider to discuss diabetes management.  3.  PAD.  He does appear to have nonocclusive peripheral artery disease on the left, his left SLIME was 0.92 at rest and 0.85 post exercise.  Mr. Doyle continues to be pretty symptomatic especially in his left hip and upper leg.  He says he can walk about 2-300 feet before he symptomatic, I have asked him to restart walking once his symptoms subside.  He should continue on aspirin and statin therapy. I recommend a CT aortoiliac to further evaluate, he should follow upw with one of our vascular physicians after for recommendations. Hopefully we can schedule thi on the same day, he has to schedule 3 days off of work to come see us given [place]. He should continue walking therapy, because he is a  he will have to do walking on his own.     Thank you for allowing me to care for Ander Doyle today.    RICHIE López, CNP  Cardiology  Greater than half of this 45 minute appointment was spent in counseling and coordination of care.    Voice recognition software was used for this note, I have reviewed  this note, but errors may have been missed.    No orders of the defined types were placed in this encounter.    Orders Placed This Encounter   Medications     metoprolol succinate (TOPROL-XL) 50 MG 24 hr tablet     Sig: Take 1 tablet (50 mg) by mouth daily     Dispense:  90 tablet     Refill:  3     rosuvastatin (CRESTOR) 40 MG tablet     Sig: Take 1 tablet (40 mg) by mouth daily     Dispense:  90 tablet     Refill:  3     Medications Discontinued During This Encounter   Medication Reason     PANTOPRAZOLE SODIUM PO      metoprolol succinate (TOPROL-XL) 50 MG 24 hr tablet Reorder     rosuvastatin (CRESTOR) 40 MG tablet Reorder         CURRENT MEDICATIONS:  Current Outpatient Prescriptions   Medication Sig Dispense Refill     aspirin 81 MG chewable tablet Take 1 tablet (81 mg) by mouth daily 36 tablet 0     metoprolol succinate (TOPROL-XL) 50 MG 24 hr tablet Take 1 tablet (50 mg) by mouth daily 90 tablet 3     nitroGLYcerin (NITROSTAT) 0.4 MG sublingual tablet Place 1 tablet (0.4 mg) under the tongue every 5 minutes as needed for chest pain. 25 tablet 0     rosuvastatin (CRESTOR) 40 MG tablet Take 1 tablet (40 mg) by mouth daily 90 tablet 3     [DISCONTINUED] metoprolol succinate (TOPROL-XL) 50 MG 24 hr tablet Take 1 tablet (50 mg) by mouth daily 90 tablet 0     [DISCONTINUED] rosuvastatin (CRESTOR) 40 MG tablet Take 1 tablet (40 mg) by mouth daily 90 tablet 3       ALLERGIES   No Known Allergies    PAST MEDICAL HISTORY:  Past Medical History:   Diagnosis Date     Angina pectoris (H)      Benign essential hypertension      CAD (coronary artery disease) 3/2015    cardiac cath 3/10/15: SHARONDA to LAD, SHARONDA to 1st diagonal, cardiac cath 3/7/15: BMS x2 to RCA, cardiac cath 3/6/15: SHARONDA to RCA, SHARONDA to circumflex, SHARONDA to OM     Hyperlipidemia LDL goal <70      STEMI (ST elevation myocardial infarction) (H)     inferior March 2015       PAST SURGICAL HISTORY:  Past Surgical History:   Procedure Laterality Date     HEART CATH STENT  "COR W/WO PTCA  3/6/2015    aspiration thrombectomy and PCI with BMS in prox and mid RCA, 80% LAD, CFX stents patent (prox CFX and OM branch)     HEART CATH STENT COR W/WO PTCA  3/7/2015    SHARONDA 1st diagonal and SHARONDA mid LAD       FAMILY HISTORY:  Family History   Problem Relation Age of Onset     Arrhythmia Father        SOCIAL HISTORY:  Social History     Social History     Marital status: Single     Spouse name: N/A     Number of children: N/A     Years of education: N/A     Social History Main Topics     Smoking status: Former Smoker     Packs/day: 1.50     Years: 30.00     Types: Cigarettes     Smokeless tobacco: Never Used      Comment: quit 3/6/15 -  occas. has one     Alcohol use 0.0 oz/week     0 Standard drinks or equivalent per week      Comment: rare     Drug use: No     Sexual activity: Not Asked     Other Topics Concern     Caffeine Concern No     1 can of pop daily     Sleep Concern No     sometimes     Stress Concern No     Weight Concern No     Special Diet No     more lean meats, more greens     Exercise No     Social History Narrative       Review of Systems:  Skin:  Negative       Eyes:  Negative      ENT:  Negative      Respiratory:  Negative       Cardiovascular:    Positive for;chest pain (short episodes of chest pain lasting 1 minute)    Gastroenterology: Negative      Genitourinary:  Negative      Musculoskeletal:  Positive for back pain;joint pain both feet/ legs - feel cold and hurt ,   Neurologic:  Positive for numbness or tingling of feet tingling in feet  Psychiatric:  Positive for sleep disturbances    Heme/Lymph/Imm:  Negative      Endocrine:  Negative        Physical Exam:  Vitals: /80  Pulse 68  Ht 1.854 m (6' 1\")  Wt 96.4 kg (212 lb 8 oz)  BMI 28.04 kg/m2    Constitutional:  cooperative        Skin:  warm and dry to the touch          Head:  normocephalic        Eyes:  pupils equal and round        Lymph:      ENT:  dentition good        Neck:  JVP normal        Respiratory: "  clear to auscultation;normal symmetry         Cardiac: regular rhythm;normal S1 and S2                              2+             2+            GI:  abdomen soft        Extremities and Muscular Skeletal:  no edema              Neurological:  no gross motor deficits        Psych:  Alert and Oriented x 3    Encounter Diagnoses   Name Primary?     Coronary artery disease involving native coronary artery of native heart without angina pectoris      ACS (acute coronary syndrome) (H)      ST elevation myocardial infarction involving right coronary artery (H)        Recent Lab Results:  LIPID RESULTS:  Lab Results   Component Value Date    CHOL 105 08/08/2018    HDL 30 (L) 08/08/2018    LDL 36 08/08/2018    TRIG 196 (H) 08/08/2018    CHOLHDLRATIO 4.2 07/15/2015       LIVER ENZYME RESULTS:  Lab Results   Component Value Date    ALT 13 08/08/2018       CBC RESULTS:  Lab Results   Component Value Date    WBC 10.4 06/19/2015    RBC 5.20 06/19/2015    HGB 16.1 06/19/2015    HCT 46.5 06/19/2015    MCV 89 06/19/2015    MCH 31.0 06/19/2015    MCHC 34.6 06/19/2015    RDW 13.6 06/19/2015     06/19/2015       BMP RESULTS:  Lab Results   Component Value Date     04/25/2017    POTASSIUM 4.2 04/25/2017    CHLORIDE 100 04/25/2017    CO2 29 04/25/2017    ANIONGAP 5.2 (L) 04/25/2017     (H) 04/25/2017    BUN 11 04/25/2017    CR 0.98 04/25/2017    GFRESTIMATED 80 04/25/2017    GFRESTBLACK >90 04/25/2017    VOLODYMYR 9.4 04/25/2017        A1C RESULTS:  Lab Results   Component Value Date    A1C 8.4 (H) 08/08/2018       INR RESULTS:  Lab Results   Component Value Date    INR 0.95 03/06/2015           CC  Claudio Palmer MD  [address]  [place] [id number]                    Thank you for allowing me to participate in the care of your patient.      Sincerely,     RICHIE Townsend [facility]    cc:   Claudio Palmer MD  [address]  [place]  [address]

## 2018-08-08 NOTE — MR AVS SNAPSHOT
After Visit Summary   8/8/2018    Ander Doyle    MRN: [id number]           Patient Information     Date Of Birth          1965        Visit Information        Provider Department      8/8/2018 9:00 AM Kelly Strong APRN CNP [facility]        Today's Diagnoses     Coronary artery disease involving native coronary artery of native heart without angina pectoris        ACS (acute coronary syndrome) (H)        ST elevation myocardial infarction involving right coronary artery (H)          Care Instructions    Continue rosuvastatin for your cholesterol.    Continue walking, do it as often as you can. If your symptoms get worse, please let me know.    Your hemoglobin A1C is high, please talk to your primary. I believe you have diabetes and could benefit from medications.    Stop stop stop smoking.    Alycia    [phone]          Follow-ups after your visit        Who to contact     If you have questions or need follow up information about today's clinic visit or your schedule please contact [facility] directly at [phone].  Normal or non-critical lab and imaging results will be communicated to you by MyChart, letter or phone within 4 business days after the clinic has received the results. If you do not hear from us within 7 days, please contact the clinic through MyChart or phone. If you have a critical or abnormal lab result, we will notify you by phone as soon as possible.  Submit refill requests through "[employer] or call your pharmacy and they will forward the refill request to us. Please allow 3 business days for your refill to be completed.          Additional Information About Your Visit        Care EveryWhere ID     This is your Care EveryWhere ID. This could be used by other organizations to access your [facility] medical records  [id number]        Your Vitals Were     Pulse Height BMI (Body  "Mass Index)             68 1.854 m (6' 1\") 28.04 kg/m2          Blood Pressure from Last 3 Encounters:   08/08/18 120/80   05/01/18 110/80   04/28/17 124/82    Weight from Last 3 Encounters:   08/08/18 96.4 kg (212 lb 8 oz)   05/01/18 96.6 kg (213 lb)   04/28/17 95.3 kg (210 lb)              We Performed the Following     Follow-Up with Cardiac Advanced Practice Provider          Today's Medication Changes          These changes are accurate as of 8/8/18  9:23 AM.  If you have any questions, ask your nurse or doctor.               Stop taking these medicines if you haven't already. Please contact your care team if you have questions.     PANTOPRAZOLE SODIUM PO   Stopped by:  Kelly Strong APRN CNP                Where to get your medicines      These medications were sent to [facility]/pharmacy [address] [place], [place] - [address]  [address], [place] [address]     Phone:  [phone]     metoprolol succinate 50 MG 24 hr tablet    rosuvastatin 40 MG tablet                Primary Care Provider Office Phone # Fax #    Jose Yip [phone]       [facility] [address] [facility] [address]        Equal Access to Services     DAYRON PERRY AH: Hadii ethel keys hadshawno Somateo, waaxda luqadaha, qaybta kaalmada adeegyada, abril dominguez hayjenniffer pressley . So [facility] [phone].    ATENCIÓN: Si habla español, tiene a segundo disposición servicios gratuitos de asistencia lingüística. Llame al [phone].    We comply with applicable federal civil rights laws and [place] laws. We do not discriminate on the basis of race, color, national origin, age, disability, sex, sexual orientation, or gender identity.            Thank you!     Thank you for choosing [facility]  for your care. Our goal is always to provide you with excellent care. Hearing back from our patients is one way we can continue to improve our " services. Please take a few minutes to complete the written survey that you may receive in the mail after your visit with us. Thank you!             Your Updated Medication List - Protect others around you: Learn how to safely use, store and throw away your medicines at www.disposemymeds.org.          This list is accurate as of 8/8/18  9:23 AM.  Always use your most recent med list.                   Brand Name Dispense Instructions for use Diagnosis    aspirin 81 MG chewable tablet     36 tablet    Take 1 tablet (81 mg) by mouth daily    STEMI (ST elevation myocardial infarction) (H)       metoprolol succinate 50 MG 24 hr tablet    TOPROL-XL    90 tablet    Take 1 tablet (50 mg) by mouth daily    ACS (acute coronary syndrome) (H), ST elevation myocardial infarction involving right coronary artery (H)       nitroGLYcerin 0.4 MG sublingual tablet    NITROSTAT    25 tablet    Place 1 tablet (0.4 mg) under the tongue every 5 minutes as needed for chest pain.    ST elevation myocardial infarction involving left anterior descending (LAD) coronary artery (H)       rosuvastatin 40 MG tablet    CRESTOR    90 tablet    Take 1 tablet (40 mg) by mouth daily    Coronary artery disease involving native coronary artery of native heart without angina pectoris

## 2018-08-08 NOTE — LETTER
8/8/2018    Jose Yip MD  [facility] [address]  [facility] [address]    RE: Seun Rudolph       Dear Colleague,    I had the pleasure of seeing Ander Doyle in the [facility].    HPI and Plan:   I had the pleasure of seeing Ander Doyle today in cardiology clinic follow up . He is a pleasant 52 year old patient of Dr. Palmer with an extensive history of coronary artery disease.    In March 2015 he  presented with a inferior wall myocardial infarction.  He was hypertensive, he had hypercholesterolemia, HDL deficiency and marked hypertriglyceridemia.  He tells me today that he did not know he was prediabetes.  He is a smoker, though he has reduced his cigarette smoking over time.  He has stenting in the severely diseased right coronary artery, circumflex and OM, mid LAD and first diagonal.  He did have complications after his first angiogram, details are available and Dr. Palmer's note.    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  He had occasional PVCs and the echo appeared to demonstrate his old infarct with improved LV function.    He saw Dr. Palmer in May, at that time his cholesterol panel was pretty bad with suspicion for diabetes with triglycerides 337.  His atorvastatin was switched to rosuvastatin and today his labs are somewhat improved.  In addition to this a hemoglobin A1c was elevated at 8.4, his TSH was normal.  He complained of hip discomfort that have been ongoing for the last few months with buttocks pain and pain in the back of his legs.  He had exercise ABIs done, his right SLIME was essentially normal but the left was questionable, at rest it was 0.92, post exercise it was 0.85 and he was symptomatic during the test.  He went on to have a left lower extremity ultrasound which had flow velocities in the left lower extremity  within the usual range and no significant diameter stenosis greater than 50%, he had a normal triphasic pattern.    Today he says he can walk 200-300 feet before he gets symptoms, also going up stairs causes him quite a lot of symptoms.  He describes hip pain that wraps around to his inner thigh.  He has good PD pulses    Labs Reviewed: Hemoglobin A1c 8.4, cholesterol 105, HDL 30, LDL 36, triglycerides 196, creatinine 0.98    Physical Exam  Please see Below     Assessment and Plan  1.   Coronary artery disease.  He is not having any ischemic symptoms. His medications are optimized, he is on statin, aspirin and beta blockade.   2.  Hypertriglyceridemia.  Since switching from atorvastatin 40 to Crestor 40 he has had improvement in his triglycerides.  His hemoglobin A1c is significantly elevated and I have asked him to meet with his primary care provider to discuss diabetes management.  3.  PAD.  He does appear to have nonocclusive peripheral artery disease on the left, his left SLIME was 0.92 at rest and 0.85 post exercise.  Mr. Doyle continues to be pretty symptomatic especially in his left hip and upper leg.  He says he can walk about 2-300 feet before he symptomatic, I have asked him to restart walking once his symptoms subside.  He should continue on aspirin and statin therapy. I recommend a CT aortoiliac to further evaluate, he should follow upw with one of our vascular physicians after for recommendations. Hopefully we can schedule thi on the same day, he has to schedule 3 days off of work to come see us given [place]. He should continue walking therapy, because he is a  he will have to do walking on his own.     Thank you for allowing me to care for Ander Doyle today.    RICHIE López, CNP  Cardiology  Greater than half of this 45 minute appointment was spent in counseling and coordination of care.    Voice recognition software was used for this note, I have reviewed  this note, but errors may have been missed.    No orders of the defined types were placed in this encounter.    Orders Placed This Encounter   Medications     metoprolol succinate (TOPROL-XL) 50 MG 24 hr tablet     Sig: Take 1 tablet (50 mg) by mouth daily     Dispense:  90 tablet     Refill:  3     rosuvastatin (CRESTOR) 40 MG tablet     Sig: Take 1 tablet (40 mg) by mouth daily     Dispense:  90 tablet     Refill:  3     Medications Discontinued During This Encounter   Medication Reason     PANTOPRAZOLE SODIUM PO      metoprolol succinate (TOPROL-XL) 50 MG 24 hr tablet Reorder     rosuvastatin (CRESTOR) 40 MG tablet Reorder         CURRENT MEDICATIONS:  Current Outpatient Prescriptions   Medication Sig Dispense Refill     aspirin 81 MG chewable tablet Take 1 tablet (81 mg) by mouth daily 36 tablet 0     metoprolol succinate (TOPROL-XL) 50 MG 24 hr tablet Take 1 tablet (50 mg) by mouth daily 90 tablet 3     nitroGLYcerin (NITROSTAT) 0.4 MG sublingual tablet Place 1 tablet (0.4 mg) under the tongue every 5 minutes as needed for chest pain. 25 tablet 0     rosuvastatin (CRESTOR) 40 MG tablet Take 1 tablet (40 mg) by mouth daily 90 tablet 3     [DISCONTINUED] metoprolol succinate (TOPROL-XL) 50 MG 24 hr tablet Take 1 tablet (50 mg) by mouth daily 90 tablet 0     [DISCONTINUED] rosuvastatin (CRESTOR) 40 MG tablet Take 1 tablet (40 mg) by mouth daily 90 tablet 3       ALLERGIES   No Known Allergies    PAST MEDICAL HISTORY:  Past Medical History:   Diagnosis Date     Angina pectoris (H)      Benign essential hypertension      CAD (coronary artery disease) 3/2015    cardiac cath 3/10/15: SHARONDA to LAD, SHARONDA to 1st diagonal, cardiac cath 3/7/15: BMS x2 to RCA, cardiac cath 3/6/15: SHARONDA to RCA, SHARONDA to circumflex, SHARONDA to OM     Hyperlipidemia LDL goal <70      STEMI (ST elevation myocardial infarction) (H)     inferior March 2015       PAST SURGICAL HISTORY:  Past Surgical History:   Procedure Laterality Date     HEART CATH STENT  "COR W/WO PTCA  3/6/2015    aspiration thrombectomy and PCI with BMS in prox and mid RCA, 80% LAD, CFX stents patent (prox CFX and OM branch)     HEART CATH STENT COR W/WO PTCA  3/7/2015    SHARONDA 1st diagonal and SHARONDA mid LAD       FAMILY HISTORY:  Family History   Problem Relation Age of Onset     Arrhythmia Father        SOCIAL HISTORY:  Social History     Social History     Marital status: Single     Spouse name: N/A     Number of children: N/A     Years of education: N/A     Social History Main Topics     Smoking status: Former Smoker     Packs/day: 1.50     Years: 30.00     Types: Cigarettes     Smokeless tobacco: Never Used      Comment: quit 3/6/15 -  occas. has one     Alcohol use 0.0 oz/week     0 Standard drinks or equivalent per week      Comment: rare     Drug use: No     Sexual activity: Not Asked     Other Topics Concern     Caffeine Concern No     1 can of pop daily     Sleep Concern No     sometimes     Stress Concern No     Weight Concern No     Special Diet No     more lean meats, more greens     Exercise No     Social History Narrative       Review of Systems:  Skin:  Negative       Eyes:  Negative      ENT:  Negative      Respiratory:  Negative       Cardiovascular:    Positive for;chest pain (short episodes of chest pain lasting 1 minute)    Gastroenterology: Negative      Genitourinary:  Negative      Musculoskeletal:  Positive for back pain;joint pain both feet/ legs - feel cold and hurt ,   Neurologic:  Positive for numbness or tingling of feet tingling in feet  Psychiatric:  Positive for sleep disturbances    Heme/Lymph/Imm:  Negative      Endocrine:  Negative        Physical Exam:  Vitals: /80  Pulse 68  Ht 1.854 m (6' 1\")  Wt 96.4 kg (212 lb 8 oz)  BMI 28.04 kg/m2    Constitutional:  cooperative        Skin:  warm and dry to the touch          Head:  normocephalic        Eyes:  pupils equal and round        Lymph:      ENT:  dentition good        Neck:  JVP normal        Respiratory: "  clear to auscultation;normal symmetry         Cardiac: regular rhythm;normal S1 and S2                              2+             2+            GI:  abdomen soft        Extremities and Muscular Skeletal:  no edema              Neurological:  no gross motor deficits        Psych:  Alert and Oriented x 3    Encounter Diagnoses   Name Primary?     Coronary artery disease involving native coronary artery of native heart without angina pectoris      ACS (acute coronary syndrome) (H)      ST elevation myocardial infarction involving right coronary artery (H)        Recent Lab Results:  LIPID RESULTS:  Lab Results   Component Value Date    CHOL 105 08/08/2018    HDL 30 (L) 08/08/2018    LDL 36 08/08/2018    TRIG 196 (H) 08/08/2018    CHOLHDLRATIO 4.2 07/15/2015       LIVER ENZYME RESULTS:  Lab Results   Component Value Date    ALT 13 08/08/2018       CBC RESULTS:  Lab Results   Component Value Date    WBC 10.4 06/19/2015    RBC 5.20 06/19/2015    HGB 16.1 06/19/2015    HCT 46.5 06/19/2015    MCV 89 06/19/2015    MCH 31.0 06/19/2015    MCHC 34.6 06/19/2015    RDW 13.6 06/19/2015     06/19/2015       BMP RESULTS:  Lab Results   Component Value Date     04/25/2017    POTASSIUM 4.2 04/25/2017    CHLORIDE 100 04/25/2017    CO2 29 04/25/2017    ANIONGAP 5.2 (L) 04/25/2017     (H) 04/25/2017    BUN 11 04/25/2017    CR 0.98 04/25/2017    GFRESTIMATED 80 04/25/2017    GFRESTBLACK >90 04/25/2017    VOLODYMYR 9.4 04/25/2017        A1C RESULTS:  Lab Results   Component Value Date    A1C 8.4 (H) 08/08/2018       INR RESULTS:  Lab Results   Component Value Date    INR 0.95 03/06/2015         Thank you for allowing me to participate in the care of your patient.    Sincerely,     RICHIE Townsend [facility]

## 2018-08-08 NOTE — PATIENT INSTRUCTIONS
Continue rosuvastatin for your cholesterol.    Continue walking, do it as often as you can. If your symptoms get worse, please let me know.    Your hemoglobin A1C is high, please talk to your primary. I believe you have diabetes and could benefit from medications.    Stop stop stop smoking.    [place] [phone]

## 2018-08-08 NOTE — PROGRESS NOTES
HPI and Plan:   I had the pleasure of seeing Ander Doyle today in cardiology clinic follow up . He is a pleasant 52 year old patient of Dr. Palmer with an extensive history of coronary artery disease.    In March 2015 he  presented with a inferior wall myocardial infarction.  He was hypertensive, he had hypercholesterolemia, HDL deficiency and marked hypertriglyceridemia.  He tells me today that he did not know he was prediabetes.  He is a smoker, though he has reduced his cigarette smoking over time.  He has stenting in the severely diseased right coronary artery, circumflex and OM, mid LAD and first diagonal.  He did have complications after his first angiogram, details are available and Dr. Palmer's note.    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  He had occasional PVCs and the echo appeared to demonstrate his old infarct with improved LV function.    He saw Dr. Palmer in May, at that time his cholesterol panel was pretty bad with suspicion for diabetes with triglycerides 337.  His atorvastatin was switched to rosuvastatin and today his labs are somewhat improved.  In addition to this a hemoglobin A1c was elevated at 8.4, his TSH was normal.  He complained of hip discomfort that have been ongoing for the last few months with buttocks pain and pain in the back of his legs.  He had exercise ABIs done, his right SLIME was essentially normal but the left was questionable, at rest it was 0.92, post exercise it was 0.85 and he was symptomatic during the test.  He went on to have a left lower extremity ultrasound which had flow velocities in the left lower extremity within the usual range and no significant diameter stenosis greater than 50%, he had a normal triphasic pattern.    Today he says he can walk 200-300 feet before he gets symptoms, also going up stairs causes him quite a lot of symptoms.  He describes  hip pain that wraps around to his inner thigh.  He has good PD pulses    Labs Reviewed: Hemoglobin A1c 8.4, cholesterol 105, HDL 30, LDL 36, triglycerides 196, creatinine 0.98    Physical Exam  Please see Below     Assessment and Plan  1.   Coronary artery disease.  He is not having any ischemic symptoms. His medications are optimized, he is on statin, aspirin and beta blockade.   2.  Hypertriglyceridemia.  Since switching from atorvastatin 40 to Crestor 40 he has had improvement in his triglycerides.  His hemoglobin A1c is significantly elevated and I have asked him to meet with his primary care provider to discuss diabetes management.  3.  PAD.  He does appear to have nonocclusive peripheral artery disease on the left, his left SLIME was 0.92 at rest and 0.85 post exercise.  Mr. Doyle continues to be pretty symptomatic especially in his left hip and upper leg.  He says he can walk about 2-300 feet before he symptomatic, I have asked him to restart walking once his symptoms subside.  He should continue on aspirin and statin therapy. I recommend a CT aortoiliac to further evaluate, he should follow upw with one of our vascular physicians after for recommendations. Hopefully we can schedule thi on the same day, he has to schedule 3 days off of work to come see us given his [place]. He should continue walking therapy, because he is a  he will have to do walking on his own.     Thank you for allowing me to care for Ander Doyle today.    RICHIE López, CNP  Cardiology  Greater than half of this 45 minute appointment was spent in counseling and coordination of care.    Voice recognition software was used for this note, I have reviewed this note, but errors may have been missed.    No orders of the defined types were placed in this encounter.    Orders Placed This Encounter   Medications     metoprolol succinate (TOPROL-XL) 50 MG 24 hr tablet     Sig: Take 1 tablet (50 mg) by mouth  daily     Dispense:  90 tablet     Refill:  3     rosuvastatin (CRESTOR) 40 MG tablet     Sig: Take 1 tablet (40 mg) by mouth daily     Dispense:  90 tablet     Refill:  3     Medications Discontinued During This Encounter   Medication Reason     PANTOPRAZOLE SODIUM PO      metoprolol succinate (TOPROL-XL) 50 MG 24 hr tablet Reorder     rosuvastatin (CRESTOR) 40 MG tablet Reorder         CURRENT MEDICATIONS:  Current Outpatient Prescriptions   Medication Sig Dispense Refill     aspirin 81 MG chewable tablet Take 1 tablet (81 mg) by mouth daily 36 tablet 0     metoprolol succinate (TOPROL-XL) 50 MG 24 hr tablet Take 1 tablet (50 mg) by mouth daily 90 tablet 3     nitroGLYcerin (NITROSTAT) 0.4 MG sublingual tablet Place 1 tablet (0.4 mg) under the tongue every 5 minutes as needed for chest pain. 25 tablet 0     rosuvastatin (CRESTOR) 40 MG tablet Take 1 tablet (40 mg) by mouth daily 90 tablet 3     [DISCONTINUED] metoprolol succinate (TOPROL-XL) 50 MG 24 hr tablet Take 1 tablet (50 mg) by mouth daily 90 tablet 0     [DISCONTINUED] rosuvastatin (CRESTOR) 40 MG tablet Take 1 tablet (40 mg) by mouth daily 90 tablet 3       ALLERGIES   No Known Allergies    PAST MEDICAL HISTORY:  Past Medical History:   Diagnosis Date     Angina pectoris (H)      Benign essential hypertension      CAD (coronary artery disease) 3/2015    cardiac cath 3/10/15: SHARONDA to LAD, SHARONDA to 1st diagonal, cardiac cath 3/7/15: BMS x2 to RCA, cardiac cath 3/6/15: SHARONDA to RCA, SHARONDA to circumflex, SHARONDA to OM     Hyperlipidemia LDL goal <70      STEMI (ST elevation myocardial infarction) (H)     inferior March 2015       PAST SURGICAL HISTORY:  Past Surgical History:   Procedure Laterality Date     HEART CATH STENT COR W/WO PTCA  3/6/2015    aspiration thrombectomy and PCI with BMS in prox and mid RCA, 80% LAD, CFX stents patent (prox CFX and OM branch)     HEART CATH STENT COR W/WO PTCA  3/7/2015    SHARONDA 1st diagonal and SHARONDA mid LAD       FAMILY  "HISTORY:  Family History   Problem Relation Age of Onset     Arrhythmia Father        SOCIAL HISTORY:  Social History     Social History     Marital status: Single     Spouse name: N/A     Number of children: N/A     Years of education: N/A     Social History Main Topics     Smoking status: Former Smoker     Packs/day: 1.50     Years: 30.00     Types: Cigarettes     Smokeless tobacco: Never Used      Comment: quit 3/6/15 -  occas. has one     Alcohol use 0.0 oz/week     0 Standard drinks or equivalent per week      Comment: rare     Drug use: No     Sexual activity: Not Asked     Other Topics Concern     Caffeine Concern No     1 can of pop daily     Sleep Concern No     sometimes     Stress Concern No     Weight Concern No     Special Diet No     more lean meats, more greens     Exercise No     Social History Narrative       Review of Systems:  Skin:  Negative       Eyes:  Negative      ENT:  Negative      Respiratory:  Negative       Cardiovascular:    Positive for;chest pain (short episodes of chest pain lasting 1 minute)    Gastroenterology: Negative      Genitourinary:  Negative      Musculoskeletal:  Positive for back pain;joint pain both feet/ legs - feel cold and hurt ,   Neurologic:  Positive for numbness or tingling of feet tingling in feet  Psychiatric:  Positive for sleep disturbances    Heme/Lymph/Imm:  Negative      Endocrine:  Negative        Physical Exam:  Vitals: /80  Pulse 68  Ht 1.854 m (6' 1\")  Wt 96.4 kg (212 lb 8 oz)  BMI 28.04 kg/m2    Constitutional:  cooperative        Skin:  warm and dry to the touch          Head:  normocephalic        Eyes:  pupils equal and round        Lymph:      ENT:  dentition good        Neck:  JVP normal        Respiratory:  clear to auscultation;normal symmetry         Cardiac: regular rhythm;normal S1 and S2                              2+             2+            GI:  abdomen soft        Extremities and Muscular Skeletal:  no edema          "     Neurological:  no gross motor deficits        Psych:  Alert and Oriented x 3    Encounter Diagnoses   Name Primary?     Coronary artery disease involving native coronary artery of native heart without angina pectoris      ACS (acute coronary syndrome) (H)      ST elevation myocardial infarction involving right coronary artery (H)        Recent Lab Results:  LIPID RESULTS:  Lab Results   Component Value Date    CHOL 105 08/08/2018    HDL 30 (L) 08/08/2018    LDL 36 08/08/2018    TRIG 196 (H) 08/08/2018    CHOLHDLRATIO 4.2 07/15/2015       LIVER ENZYME RESULTS:  Lab Results   Component Value Date    ALT 13 08/08/2018       CBC RESULTS:  Lab Results   Component Value Date    WBC 10.4 06/19/2015    RBC 5.20 06/19/2015    HGB 16.1 06/19/2015    HCT 46.5 06/19/2015    MCV 89 06/19/2015    MCH 31.0 06/19/2015    MCHC 34.6 06/19/2015    RDW 13.6 06/19/2015     06/19/2015       BMP RESULTS:  Lab Results   Component Value Date     04/25/2017    POTASSIUM 4.2 04/25/2017    CHLORIDE 100 04/25/2017    CO2 29 04/25/2017    ANIONGAP 5.2 (L) 04/25/2017     (H) 04/25/2017    BUN 11 04/25/2017    CR 0.98 04/25/2017    GFRESTIMATED 80 04/25/2017    GFRESTBLACK >90 04/25/2017    VOLODYMYR 9.4 04/25/2017        A1C RESULTS:  Lab Results   Component Value Date    A1C 8.4 (H) 08/08/2018       INR RESULTS:  Lab Results   Component Value Date    INR 0.95 03/06/2015           CC  Claudio Palmer MD  [address]  [place] [id number]

## 2018-09-11 ENCOUNTER — HOSPITAL ENCOUNTER (OUTPATIENT)
Dept: CT IMAGING | Facility: CLINIC | Age: 53
Discharge: HOME | End: 2018-09-11
Attending: NURSE PRACTITIONER | Admitting: NURSE PRACTITIONER
Payer: COMMERCIAL

## 2018-09-11 DIAGNOSIS — I73.9 PAD (PERIPHERAL ARTERY DISEASE) (H): ICD-10-CM

## 2018-09-11 PROCEDURE — 25000128 H RX IP 250 OP 636: Performed by: RADIOLOGY

## 2018-09-11 PROCEDURE — 75635 CT ANGIO ABDOMINAL ARTERIES: CPT

## 2018-09-11 RX ORDER — IOPAMIDOL 755 MG/ML
500 INJECTION, SOLUTION INTRAVASCULAR ONCE
Status: COMPLETED | OUTPATIENT
Start: 2018-09-11 | End: 2018-09-11

## 2018-09-11 RX ADMIN — SODIUM CHLORIDE 80 ML: 900 INJECTION, SOLUTION INTRAVENOUS at 08:08

## 2018-09-11 RX ADMIN — IOPAMIDOL 100 ML: 755 INJECTION, SOLUTION INTRAVENOUS at 08:08

## 2018-09-21 ENCOUNTER — OFFICE VISIT (OUTPATIENT)
Dept: CARDIOLOGY | Facility: CLINIC | Age: 53
End: 2018-09-21
Payer: COMMERCIAL

## 2018-09-21 VITALS
HEART RATE: 80 BPM | DIASTOLIC BLOOD PRESSURE: 78 MMHG | SYSTOLIC BLOOD PRESSURE: 118 MMHG | WEIGHT: 211.3 LBS | HEIGHT: 73 IN | BODY MASS INDEX: 28 KG/M2

## 2018-09-21 DIAGNOSIS — I25.10 CORONARY ARTERY DISEASE INVOLVING NATIVE CORONARY ARTERY OF NATIVE HEART WITHOUT ANGINA PECTORIS: Primary | Chronic | ICD-10-CM

## 2018-09-21 DIAGNOSIS — I73.9 PAD (PERIPHERAL ARTERY DISEASE) (H): ICD-10-CM

## 2018-09-21 DIAGNOSIS — R07.89 ATYPICAL CHEST PAIN: ICD-10-CM

## 2018-09-21 PROCEDURE — 93005 ELECTROCARDIOGRAM TRACING: CPT | Performed by: INTERNAL MEDICINE

## 2018-09-21 PROCEDURE — 99214 OFFICE O/P EST MOD 30 MIN: CPT | Performed by: INTERNAL MEDICINE

## 2018-09-21 NOTE — PATIENT INSTRUCTIONS
Try cutting the TOPROL dose in half to 25mg daily and if you feel better with the smaller dose, call us and we can refill at the smaller dose so you don't have to cut pills.    If the left leg doesn't feel better or if it gets worse, please let us know and we might consider an angiogram of the leg and possible stent to improve flow.

## 2018-09-21 NOTE — LETTER
9/21/2018    Jose Yip MD  [facility] [address]  [facility] [address]    RE: Ander Bonillaolph       Dear Colleague,    I had the pleasure of seeing Ander Doyle in the [facility].    Vascular Cardiology Consultation      Ander Doyle MRN# [id number]   YOB: 1965 Age: 52 year old   Date of Visit 09/21/2018     Reason for consult:  Left lower extremity thigh fatigue           Assessment and Plan:     1. Left lower extremity thigh fatigue in the setting of mild to moderate iliac artery stenosis and abnormal SLIME    We have reviewed the CT, SLIME and ultrasound.  There is no significant distal peripheral arterial disease.  There is some aortoiliac plaque and mild to moderate stenosis.  In general, this would not give claudication and thigh symptoms.    However the decreased pressure on the SLIME, it is possible that a few compounding factors could be giving him some symptoms of arterial insufficiency.    Will try cutting his Toprol dose from 50 mg down to 25 mg and see if his stamina and leg fatigue improve.  If so, will attribute his symptoms to a mild to moderate stenosis exacerbated by low heart rate and blood pressure.  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        This note was transcribed using electronic voice recognition software and may contain typographical errors.             Chief Complaint:   Heart Problem (Establish vascular care due to claudication, with left lower extremity weakness)           History of Present Illness:   This patient is a very pleasant 52 year old male with coronary artery disease who has been on Toprol 50 mg daily for a number of years.  On exercise SLIME 5/22/2018 he had a drop in bilateral lower extremity pressures post exercise but left greater than right.  Follow-up ultrasound did not show any focal stenoses and CT aortoiliac with  "runoff had some aortoiliac stenosis, but not severe.  Distal leg arteries were very normal.    Patient states he can walk approximately 600 feet with a 35 pound bag in his work as a .  He continues to walk despite the claudication that manifests as by heaviness.  Some days, it does not bother him at all.  He was not sure if stretching was helping things.               Physical Exam:       Vitals: /78 (BP Location: Right arm, Patient Position: Chair, Cuff Size: Adult Regular)  Pulse 80  Ht 1.854 m (6' 1\")  Wt 95.8 kg (211 lb 4.8 oz)  BMI 27.88 kg/m2  Constitutional:  cooperative, alert and oriented, well developed, well nourished, in no acute distress        Skin:  warm and dry to the touch, no apparent skin lesions or masses noted        Head:  normocephalic, no masses or lesions        Eyes:  pupils equal and round, conjunctivae and lids unremarkable, sclera white, no xanthalasma, EOMS intact, no nystagmus        ENT:  no pallor or cyanosis, dentition good        Neck:  JVP normal        Chest:  clear to auscultation;normal symmetry        Cardiac: regular rhythm;normal S1 and S2                  Abdomen:      benign    Vascular:               2+             2+          Extremities and Back:  no edema;no deformities, clubbing, cyanosis, erythema observed        Neurological:  no gross motor deficits;affect appropriate                      Past Medical History:   I have reviewed this patient's past medical history  Past Medical History:   Diagnosis Date     Angina pectoris (H)      Benign essential hypertension      CAD (coronary artery disease) 3/2015    cardiac cath 3/10/15: SHARONDA to LAD, SHARONDA to 1st diagonal, cardiac cath 3/7/15: BMS x2 to RCA, cardiac cath 3/6/15: SHARONDA to RCA, SHARONDA to circumflex, SHARONDA to OM     Hyperlipidemia LDL goal <70      STEMI (ST elevation myocardial infarction) (H)     inferior March 2015             Past Surgical History:   I have reviewed this patient's past surgical " history  Past Surgical History:   Procedure Laterality Date     HEART CATH STENT COR W/WO PTCA  3/6/2015    aspiration thrombectomy and PCI with BMS in prox and mid RCA, 80% LAD, CFX stents patent (prox CFX and OM branch)     HEART CATH STENT COR W/WO PTCA  3/7/2015    SHARONDA 1st diagonal and SHARONDA mid LAD               Social History:   I have reviewed this patient's social history  Social History   Substance Use Topics     Smoking status: Former Smoker     Packs/day: 1.50     Years: 30.00     Types: Cigarettes     Smokeless tobacco: Never Used      Comment: quit 3/6/15 -  occas. has one     Alcohol use 0.0 oz/week     0 Standard drinks or equivalent per week      Comment: rare             Family History:   I have reviewed this patient's family history  Family History   Problem Relation Age of Onset     Arrhythmia Father              Allergies:   No Known Allergies          Medications:   I have reviewed this patient's current medications  Current Outpatient Prescriptions   Medication Sig Dispense Refill     aspirin 81 MG chewable tablet Take 1 tablet (81 mg) by mouth daily 36 tablet 0     metoprolol succinate (TOPROL-XL) 50 MG 24 hr tablet Take 1 tablet (50 mg) by mouth daily 90 tablet 3     nitroGLYcerin (NITROSTAT) 0.4 MG sublingual tablet Place 1 tablet (0.4 mg) under the tongue every 5 minutes as needed for chest pain. 25 tablet 0     rosuvastatin (CRESTOR) 40 MG tablet Take 1 tablet (40 mg) by mouth daily 90 tablet 3               Review of Systems:   Review of Systems:  11 point review of systems performed and negative except as for HPI and PMH              Data:   All laboratory data reviewed  Lab Results   Component Value Date    CHOL 105 08/08/2018     Lab Results   Component Value Date    HDL 30 08/08/2018     Lab Results   Component Value Date    LDL 36 08/08/2018     Lab Results   Component Value Date    TRIG 196 08/08/2018     Lab Results   Component Value Date    CHOLHDLRATIO 4.2 07/15/2015     TSH   Date  Value Ref Range Status   08/08/2018 1.76 0.40 - 4.00 mU/L Final     Last Basic Metabolic Panel:  Lab Results   Component Value Date     04/25/2017      Lab Results   Component Value Date    POTASSIUM 4.2 04/25/2017     Lab Results   Component Value Date    CHLORIDE 100 04/25/2017     Lab Results   Component Value Date    VOLODYMYR 9.4 04/25/2017     Lab Results   Component Value Date    CO2 29 04/25/2017     Lab Results   Component Value Date    BUN 11 04/25/2017     Lab Results   Component Value Date    CR 0.98 04/25/2017     Lab Results   Component Value Date     04/25/2017     Lab Results   Component Value Date    WBC 10.4 06/19/2015     Lab Results   Component Value Date    RBC 5.20 06/19/2015     Lab Results   Component Value Date    HGB 16.1 06/19/2015     Lab Results   Component Value Date    HCT 46.5 06/19/2015     Lab Results   Component Value Date    MCV 89 06/19/2015     Lab Results   Component Value Date    MCH 31.0 06/19/2015     Lab Results   Component Value Date    MCHC 34.6 06/19/2015     Lab Results   Component Value Date    RDW 13.6 06/19/2015     Lab Results   Component Value Date     06/19/2015       Thank you for allowing me to participate in the care of your patient.      Sincerely,     Augustine Hernández MD     [facility] [place]    cc:   Jose Yip MD  [facility]  [id number] [place] [address]

## 2018-09-21 NOTE — LETTER
9/21/2018    Jose Yip MD  [facility] [address]  [facility] [address]    RE: Ander Bonillaolph       Dear Colleague,    I had the pleasure of seeing Ander Doyle in the [facility].    Vascular Cardiology Consultation      Ander Doyle MRN# [id number]   YOB: 1965 Age: 52 year old   Date of Visit 09/21/2018     Reason for consult:  Left lower extremity thigh fatigue           Assessment and Plan:     1. Left lower extremity thigh fatigue in the setting of mild to moderate iliac artery stenosis and abnormal SLIME    We have reviewed the CT, SLIME and ultrasound.  There is no significant distal peripheral arterial disease.  There is some aortoiliac plaque and mild to moderate stenosis.  In general, this would not give claudication and thigh symptoms.    However the decreased pressure on the SLIME, it is possible that a few compounding factors could be giving him some symptoms of arterial insufficiency.    Will try cutting his Toprol dose from 50 mg down to 25 mg and see if his stamina and leg fatigue improve.  If so, will attribute his symptoms to a mild to moderate stenosis exacerbated by low heart rate and blood pressure.  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        This note was transcribed using electronic voice recognition software and may contain typographical errors.             Chief Complaint:   Heart Problem (Establish vascular care due to claudication, with left lower extremity weakness)           History of Present Illness:   This patient is a very pleasant 52 year old male with coronary artery disease who has been on Toprol 50 mg daily for a number of years.  On exercise SLIME 5/22/2018 he had a drop in bilateral lower extremity pressures post exercise but left greater than right.  Follow-up ultrasound did not show any focal stenoses and CT aortoiliac with  "runoff had some aortoiliac stenosis, but not severe.  Distal leg arteries were very normal.    Patient states he can walk approximately 600 feet with a 35 pound bag in his work as a .  He continues to walk despite the claudication that manifests as by heaviness.  Some days, it does not bother him at all.  He was not sure if stretching was helping things.               Physical Exam:       Vitals: /78 (BP Location: Right arm, Patient Position: Chair, Cuff Size: Adult Regular)  Pulse 80  Ht 1.854 m (6' 1\")  Wt 95.8 kg (211 lb 4.8 oz)  BMI 27.88 kg/m2  Constitutional:  cooperative, alert and oriented, well developed, well nourished, in no acute distress        Skin:  warm and dry to the touch, no apparent skin lesions or masses noted        Head:  normocephalic, no masses or lesions        Eyes:  pupils equal and round, conjunctivae and lids unremarkable, sclera white, no xanthalasma, EOMS intact, no nystagmus        ENT:  no pallor or cyanosis, dentition good        Neck:  JVP normal        Chest:  clear to auscultation;normal symmetry        Cardiac: regular rhythm;normal S1 and S2                  Abdomen:      benign    Vascular:               2+             2+          Extremities and Back:  no edema;no deformities, clubbing, cyanosis, erythema observed        Neurological:  no gross motor deficits;affect appropriate                      Past Medical History:   I have reviewed this patient's past medical history  Past Medical History:   Diagnosis Date     Angina pectoris (H)      Benign essential hypertension      CAD (coronary artery disease) 3/2015    cardiac cath 3/10/15: SHARONDA to LAD, SHARONDA to 1st diagonal, cardiac cath 3/7/15: BMS x2 to RCA, cardiac cath 3/6/15: SHARONDA to RCA, SHARONDA to circumflex, SHARONDA to OM     Hyperlipidemia LDL goal <70      STEMI (ST elevation myocardial infarction) (H)     inferior March 2015             Past Surgical History:   I have reviewed this patient's past surgical " history  Past Surgical History:   Procedure Laterality Date     HEART CATH STENT COR W/WO PTCA  3/6/2015    aspiration thrombectomy and PCI with BMS in prox and mid RCA, 80% LAD, CFX stents patent (prox CFX and OM branch)     HEART CATH STENT COR W/WO PTCA  3/7/2015    SHARONDA 1st diagonal and SHARONDA mid LAD               Social History:   I have reviewed this patient's social history  Social History   Substance Use Topics     Smoking status: Former Smoker     Packs/day: 1.50     Years: 30.00     Types: Cigarettes     Smokeless tobacco: Never Used      Comment: quit 3/6/15 -  occas. has one     Alcohol use 0.0 oz/week     0 Standard drinks or equivalent per week      Comment: rare             Family History:   I have reviewed this patient's family history  Family History   Problem Relation Age of Onset     Arrhythmia Father              Allergies:   No Known Allergies          Medications:   I have reviewed this patient's current medications  Current Outpatient Prescriptions   Medication Sig Dispense Refill     aspirin 81 MG chewable tablet Take 1 tablet (81 mg) by mouth daily 36 tablet 0     metoprolol succinate (TOPROL-XL) 50 MG 24 hr tablet Take 1 tablet (50 mg) by mouth daily 90 tablet 3     nitroGLYcerin (NITROSTAT) 0.4 MG sublingual tablet Place 1 tablet (0.4 mg) under the tongue every 5 minutes as needed for chest pain. 25 tablet 0     rosuvastatin (CRESTOR) 40 MG tablet Take 1 tablet (40 mg) by mouth daily 90 tablet 3               Review of Systems:   Review of Systems:  11 point review of systems performed and negative except as for HPI and PMH              Data:   All laboratory data reviewed  Lab Results   Component Value Date    CHOL 105 08/08/2018     Lab Results   Component Value Date    HDL 30 08/08/2018     Lab Results   Component Value Date    LDL 36 08/08/2018     Lab Results   Component Value Date    TRIG 196 08/08/2018     Lab Results   Component Value Date    CHOLHDLRATIO 4.2 07/15/2015     TSH   Date  Value Ref Range Status   08/08/2018 1.76 0.40 - 4.00 mU/L Final     Last Basic Metabolic Panel:  Lab Results   Component Value Date     04/25/2017      Lab Results   Component Value Date    POTASSIUM 4.2 04/25/2017     Lab Results   Component Value Date    CHLORIDE 100 04/25/2017     Lab Results   Component Value Date    VOLODYMYR 9.4 04/25/2017     Lab Results   Component Value Date    CO2 29 04/25/2017     Lab Results   Component Value Date    BUN 11 04/25/2017     Lab Results   Component Value Date    CR 0.98 04/25/2017     Lab Results   Component Value Date     04/25/2017     Lab Results   Component Value Date    WBC 10.4 06/19/2015     Lab Results   Component Value Date    RBC 5.20 06/19/2015     Lab Results   Component Value Date    HGB 16.1 06/19/2015     Lab Results   Component Value Date    HCT 46.5 06/19/2015     Lab Results   Component Value Date    MCV 89 06/19/2015     Lab Results   Component Value Date    MCH 31.0 06/19/2015     Lab Results   Component Value Date    MCHC 34.6 06/19/2015     Lab Results   Component Value Date    RDW 13.6 06/19/2015     Lab Results   Component Value Date     06/19/2015       Thank you for allowing me to participate in the care of your patient.    Sincerely,     Augustine Hernández MD     [facility]

## 2018-09-21 NOTE — MR AVS SNAPSHOT
After Visit Summary   9/21/2018    Ander Doyle    MRN: [id number]           Patient Information     Date Of Birth          1965        Visit Information        Provider Department      9/21/2018 12:45 PM Augustine Hernández MD [facility]        Today's Diagnoses     Coronary artery disease involving native coronary artery of native heart without angina pectoris    -  1    PAD (peripheral artery disease) (H)        Atypical chest pain          Care Instructions    Try cutting the TOPROL dose in half to 25mg daily and if you feel better with the smaller dose, call us and we can refill at the smaller dose so you don't have to cut pills.    If the left leg doesn't feel better or if it gets worse, please let us know and we might consider an angiogram of the leg and possible stent to improve flow.              Follow-ups after your visit        Who to contact     If you have questions or need follow up information about today's clinic visit or your schedule please contact [facility] directly at [phone].  Normal or non-critical lab and imaging results will be communicated to you by MyChart, letter or phone within 4 business days after the clinic has received the results. If you do not hear from us within 7 days, please contact the clinic through MyChart or phone. If you have a critical or abnormal lab result, we will notify you by phone as soon as possible.  Submit refill requests through [email or web address] or call your pharmacy and they will forward the refill request to us. Please allow 3 business days for your refill to be completed.          Additional Information About Your Visit        Care EveryWhere ID     This is your Care EveryWhere ID. This could be used by other organizations to access your [place] medical records  [id number]        Your Vitals Were     Pulse Height BMI (Body Mass Index)              "80 1.854 m (6' 1\") 27.88 kg/m2          Blood Pressure from Last 3 Encounters:   09/21/18 118/78   08/08/18 120/80   05/01/18 110/80    Weight from Last 3 Encounters:   09/21/18 95.8 kg (211 lb 4.8 oz)   08/08/18 96.4 kg (212 lb 8 oz)   05/01/18 96.6 kg (213 lb)              We Performed the Following     EKG 12-lead complete w/read - Clinics (performed today)     Follow-Up with Vascular Cardiologist        Primary Care Provider Office Phone # Fax #    Jose Yip [phone]       [facility] [address] [facility] [address]        Equal Access to Services     DAYRON PERRY : Hadii ethel lotto Somateo, waaxakil luqadaha, qaybta kaalmada adesharonyada, abril hendrickson. So [facility] [phone].    ATENCIÓN: Si habla español, tiene a segundo disposición servicios gratuitos de asistencia lingüística. Llame al [phone].    We comply with applicable federal civil rights laws and [place] laws. We do not discriminate on the basis of race, color, national origin, age, disability, sex, sexual orientation, or gender identity.            Thank you!     Thank you for choosing [facility]  for your care. Our goal is always to provide you with excellent care. Hearing back from our patients is one way we can continue to improve our services. Please take a few minutes to complete the written survey that you may receive in the mail after your visit with us. Thank you!             Your Updated Medication List - Protect others around you: Learn how to safely use, store and throw away your medicines at www.disposemymeds.org.          This list is accurate as of 9/21/18  1:10 PM.  Always use your most recent med list.                   Brand Name Dispense Instructions for use Diagnosis    aspirin 81 MG chewable tablet     36 tablet    Take 1 tablet (81 mg) by mouth daily    STEMI (ST elevation myocardial infarction) (H)       " metoprolol succinate 50 MG 24 hr tablet    TOPROL-XL    90 tablet    Take 1 tablet (50 mg) by mouth daily    ACS (acute coronary syndrome) (H), ST elevation myocardial infarction involving right coronary artery (H)       nitroGLYcerin 0.4 MG sublingual tablet    NITROSTAT    25 tablet    Place 1 tablet (0.4 mg) under the tongue every 5 minutes as needed for chest pain.    ST elevation myocardial infarction involving left anterior descending (LAD) coronary artery (H)       rosuvastatin 40 MG tablet    CRESTOR    90 tablet    Take 1 tablet (40 mg) by mouth daily    Coronary artery disease involving native coronary artery of native heart without angina pectoris

## 2018-09-21 NOTE — PROGRESS NOTES
Vascular Cardiology Consultation      Ander Doyle MRN# [id number]   YOB: 1965 Age: 52 year old   Date of Visit 09/21/2018     Reason for consult:  Left lower extremity thigh fatigue           Assessment and Plan:     1. Left lower extremity thigh fatigue in the setting of mild to moderate iliac artery stenosis and abnormal SLIME    We have reviewed the CT, SLIME and ultrasound.  There is no significant distal peripheral arterial disease.  There is some aortoiliac plaque and mild to moderate stenosis.  In general, this would not give claudication and thigh symptoms.    However the decreased pressure on the SLIME, it is possible that a few compounding factors could be giving him some symptoms of arterial insufficiency.    Will try cutting his Toprol dose from 50 mg down to 25 mg and see if his stamina and leg fatigue improve.  If so, will attribute his symptoms to a mild to moderate stenosis exacerbated by low heart rate and blood pressure.  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        This note was transcribed using electronic voice recognition software and may contain typographical errors.             Chief Complaint:   Heart Problem (Establish vascular care due to claudication, with left lower extremity weakness)           History of Present Illness:   This patient is a very pleasant 52 year old male with coronary artery disease who has been on Toprol 50 mg daily for a number of years.  On exercise SLIME 5/22/2018 he had a drop in bilateral lower extremity pressures post exercise but left greater than right.  Follow-up ultrasound did not show any focal stenoses and CT aortoiliac with runoff had some aortoiliac stenosis, but not severe.  Distal leg arteries were very normal.    Patient states he can walk approximately 600 feet with a 35 pound bag in his work as a .  He continues to walk despite the claudication that manifests as  "by heaviness.  Some days, it does not bother him at all.  He was not sure if stretching was helping things.               Physical Exam:       Vitals: /78 (BP Location: Right arm, Patient Position: Chair, Cuff Size: Adult Regular)  Pulse 80  Ht 1.854 m (6' 1\")  Wt 95.8 kg (211 lb 4.8 oz)  BMI 27.88 kg/m2  Constitutional:  cooperative, alert and oriented, well developed, well nourished, in no acute distress        Skin:  warm and dry to the touch, no apparent skin lesions or masses noted        Head:  normocephalic, no masses or lesions        Eyes:  pupils equal and round, conjunctivae and lids unremarkable, sclera white, no xanthalasma, EOMS intact, no nystagmus        ENT:  no pallor or cyanosis, dentition good        Neck:  JVP normal        Chest:  clear to auscultation;normal symmetry        Cardiac: regular rhythm;normal S1 and S2                  Abdomen:      benign    Vascular:               2+             2+          Extremities and Back:  no edema;no deformities, clubbing, cyanosis, erythema observed        Neurological:  no gross motor deficits;affect appropriate                      Past Medical History:   I have reviewed this patient's past medical history  Past Medical History:   Diagnosis Date     Angina pectoris (H)      Benign essential hypertension      CAD (coronary artery disease) 3/2015    cardiac cath 3/10/15: SHARONDA to LAD, SHARONDA to 1st diagonal, cardiac cath 3/7/15: BMS x2 to RCA, cardiac cath 3/6/15: SHARONDA to RCA, SHARONDA to circumflex, SHARONDA to OM     Hyperlipidemia LDL goal <70      STEMI (ST elevation myocardial infarction) (H)     inferior March 2015             Past Surgical History:   I have reviewed this patient's past surgical history  Past Surgical History:   Procedure Laterality Date     HEART CATH STENT COR W/WO PTCA  3/6/2015    aspiration thrombectomy and PCI with BMS in prox and mid RCA, 80% LAD, CFX stents patent (prox CFX and OM branch)     HEART CATH STENT COR W/WO PTCA  " 3/7/2015    SHARONDA 1st diagonal and SHARONDA mid LAD               Social History:   I have reviewed this patient's social history  Social History   Substance Use Topics     Smoking status: Former Smoker     Packs/day: 1.50     Years: 30.00     Types: Cigarettes     Smokeless tobacco: Never Used      Comment: quit 3/6/15 -  occas. has one     Alcohol use 0.0 oz/week     0 Standard drinks or equivalent per week      Comment: rare             Family History:   I have reviewed this patient's family history  Family History   Problem Relation Age of Onset     Arrhythmia Father              Allergies:   No Known Allergies          Medications:   I have reviewed this patient's current medications  Current Outpatient Prescriptions   Medication Sig Dispense Refill     aspirin 81 MG chewable tablet Take 1 tablet (81 mg) by mouth daily 36 tablet 0     metoprolol succinate (TOPROL-XL) 50 MG 24 hr tablet Take 1 tablet (50 mg) by mouth daily 90 tablet 3     nitroGLYcerin (NITROSTAT) 0.4 MG sublingual tablet Place 1 tablet (0.4 mg) under the tongue every 5 minutes as needed for chest pain. 25 tablet 0     rosuvastatin (CRESTOR) 40 MG tablet Take 1 tablet (40 mg) by mouth daily 90 tablet 3               Review of Systems:   Review of Systems:  11 point review of systems performed and negative except as for HPI and PMH              Data:   All laboratory data reviewed  Lab Results   Component Value Date    CHOL 105 08/08/2018     Lab Results   Component Value Date    HDL 30 08/08/2018     Lab Results   Component Value Date    LDL 36 08/08/2018     Lab Results   Component Value Date    TRIG 196 08/08/2018     Lab Results   Component Value Date    CHOLHDLRATIO 4.2 07/15/2015     TSH   Date Value Ref Range Status   08/08/2018 1.76 0.40 - 4.00 mU/L Final     Last Basic Metabolic Panel:  Lab Results   Component Value Date     04/25/2017      Lab Results   Component Value Date    POTASSIUM 4.2 04/25/2017     Lab Results   Component Value  Date    CHLORIDE 100 04/25/2017     Lab Results   Component Value Date    VOLODYMYR 9.4 04/25/2017     Lab Results   Component Value Date    CO2 29 04/25/2017     Lab Results   Component Value Date    BUN 11 04/25/2017     Lab Results   Component Value Date    CR 0.98 04/25/2017     Lab Results   Component Value Date     04/25/2017     Lab Results   Component Value Date    WBC 10.4 06/19/2015     Lab Results   Component Value Date    RBC 5.20 06/19/2015     Lab Results   Component Value Date    HGB 16.1 06/19/2015     Lab Results   Component Value Date    HCT 46.5 06/19/2015     Lab Results   Component Value Date    MCV 89 06/19/2015     Lab Results   Component Value Date    MCH 31.0 06/19/2015     Lab Results   Component Value Date    MCHC 34.6 06/19/2015     Lab Results   Component Value Date    RDW 13.6 06/19/2015     Lab Results   Component Value Date     06/19/2015

## 2019-02-22 ENCOUNTER — TELEPHONE (OUTPATIENT)
Dept: CARDIOLOGY | Facility: CLINIC | Age: 54
End: 2019-02-22

## 2019-02-22 DIAGNOSIS — I10 BENIGN ESSENTIAL HYPERTENSION: Primary | ICD-10-CM

## 2019-02-22 DIAGNOSIS — I21.11 ST ELEVATION MYOCARDIAL INFARCTION INVOLVING RIGHT CORONARY ARTERY (H): ICD-10-CM

## 2019-02-22 DIAGNOSIS — I24.9 ACS (ACUTE CORONARY SYNDROME) (H): ICD-10-CM

## 2019-02-22 RX ORDER — METOPROLOL SUCCINATE 25 MG/1
25 TABLET, EXTENDED RELEASE ORAL DAILY
Qty: 90 TABLET | Refills: 2 | Status: SHIPPED | OUTPATIENT
Start: 2019-02-22 | End: 2019-11-19

## 2019-02-22 NOTE — TELEPHONE ENCOUNTER
Patient called requesting a refill in Metoprolol Succinate 25 mg one tablet daily.  As per Last Dr. Hernández OV 9-21-18 - Will try cutting his Toprol dose from 50 mg down to 25 mg and see if his stamina and leg fatigue improve.  If so, will attribute his symptoms to a mild to moderate stenosis exacerbated by low heart rate and blood pressure.    Patient states he is feeling well. Less leg fatigue and increased stamina.    Refill for Metoprolol Succinate 25 mg daily  E scribed.

## 2019-03-29 ENCOUNTER — TELEPHONE (OUTPATIENT)
Dept: CARDIOLOGY | Facility: CLINIC | Age: 54
End: 2019-03-29

## 2019-03-29 NOTE — TELEPHONE ENCOUNTER
Patient left voicemail asking to be scheduled for a stress test, no other details provided.     Prior order for stress echo placed 5/1/18 by Dr Palmer. Hx CAD/STEMI/stents and and other med-managed coronary stenoses.     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 Per order review, patient is also due for [facility] annual visit and labs. Offered to schedule all for April, patient agreeable to plan, transferred to scheduling to coordinate.

## 2019-04-01 ENCOUNTER — TELEPHONE (OUTPATIENT)
Dept: CARDIOLOGY | Facility: CLINIC | Age: 54
End: 2019-04-01

## 2019-04-01 NOTE — TELEPHONE ENCOUNTER
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 Patient having labs and stress echo 4-18-19.  Patient transferred to scheduling now for next available cardiologist OV between 4-18-19 and 4-26-19.

## 2019-04-18 ENCOUNTER — HOSPITAL ENCOUNTER (OUTPATIENT)
Dept: CARDIOLOGY | Facility: CLINIC | Age: 54
Discharge: HOME | End: 2019-04-18
Attending: INTERNAL MEDICINE | Admitting: INTERNAL MEDICINE
Payer: COMMERCIAL

## 2019-04-18 DIAGNOSIS — I25.10 CORONARY ARTERY DISEASE INVOLVING NATIVE CORONARY ARTERY OF NATIVE HEART WITHOUT ANGINA PECTORIS: Chronic | ICD-10-CM

## 2019-04-18 LAB
ALT SERPL W P-5'-P-CCNC: 6 U/L (ref 5–30)
ANION GAP SERPL CALCULATED.3IONS-SCNC: 12.1 MMOL/L (ref 6–17)
BUN SERPL-MCNC: 6 MG/DL (ref 7–30)
CALCIUM SERPL-MCNC: 9.9 MG/DL (ref 8.5–10.5)
CHLORIDE SERPL-SCNC: 103 MMOL/L (ref 98–107)
CHOLEST SERPL-MCNC: 111 MG/DL
CO2 SERPL-SCNC: 25 MMOL/L (ref 23–29)
CREAT SERPL-MCNC: 1.01 MG/DL (ref 0.7–1.3)
GFR SERPL CREATININE-BSD FRML MDRD: 77 ML/MIN/{1.73_M2}
GLUCOSE SERPL-MCNC: 139 MG/DL (ref 70–105)
HDLC SERPL-MCNC: 30 MG/DL
LDLC SERPL CALC-MCNC: 47 MG/DL
NONHDLC SERPL-MCNC: 81 MG/DL
POTASSIUM SERPL-SCNC: 4.1 MMOL/L (ref 3.5–5.1)
SODIUM SERPL-SCNC: 136 MMOL/L (ref 136–145)
TRIGL SERPL-MCNC: 169 MG/DL

## 2019-04-18 PROCEDURE — 80061 LIPID PANEL: CPT | Performed by: INTERNAL MEDICINE

## 2019-04-18 PROCEDURE — 93016 CV STRESS TEST SUPVJ ONLY: CPT | Performed by: INTERNAL MEDICINE

## 2019-04-18 PROCEDURE — 93321 DOPPLER ECHO F-UP/LMTD STD: CPT | Mod: 26 | Performed by: INTERNAL MEDICINE

## 2019-04-18 PROCEDURE — 93325 DOPPLER ECHO COLOR FLOW MAPG: CPT | Mod: 26 | Performed by: INTERNAL MEDICINE

## 2019-04-18 PROCEDURE — 36415 COLL VENOUS BLD VENIPUNCTURE: CPT | Performed by: INTERNAL MEDICINE

## 2019-04-18 PROCEDURE — 25500064 ZZH RX 255 OP 636: Performed by: INTERNAL MEDICINE

## 2019-04-18 PROCEDURE — 40000264 ECHO STRESS ECHOCARDIOGRAM

## 2019-04-18 PROCEDURE — 93018 CV STRESS TEST I&R ONLY: CPT | Performed by: INTERNAL MEDICINE

## 2019-04-18 PROCEDURE — 93350 STRESS TTE ONLY: CPT | Mod: 26 | Performed by: INTERNAL MEDICINE

## 2019-04-18 PROCEDURE — 80048 BASIC METABOLIC PNL TOTAL CA: CPT | Performed by: INTERNAL MEDICINE

## 2019-04-18 PROCEDURE — 84460 ALANINE AMINO (ALT) (SGPT): CPT | Performed by: INTERNAL MEDICINE

## 2019-04-18 RX ADMIN — HUMAN ALBUMIN MICROSPHERES AND PERFLUTREN 9 ML: 10; .22 INJECTION, SOLUTION INTRAVENOUS at 11:27

## 2019-04-19 ENCOUNTER — DOCUMENTATION ONLY (OUTPATIENT)
Dept: CARDIOLOGY | Facility: CLINIC | Age: 54
End: 2019-04-19

## 2019-04-19 ENCOUNTER — OFFICE VISIT (OUTPATIENT)
Dept: CARDIOLOGY | Facility: CLINIC | Age: 54
End: 2019-04-19
Payer: COMMERCIAL

## 2019-04-19 VITALS
SYSTOLIC BLOOD PRESSURE: 135 MMHG | BODY MASS INDEX: 27.57 KG/M2 | HEIGHT: 73 IN | HEART RATE: 71 BPM | DIASTOLIC BLOOD PRESSURE: 85 MMHG | WEIGHT: 208 LBS

## 2019-04-19 DIAGNOSIS — I10 BENIGN ESSENTIAL HYPERTENSION: ICD-10-CM

## 2019-04-19 DIAGNOSIS — I25.10 CORONARY ARTERY DISEASE INVOLVING NATIVE CORONARY ARTERY OF NATIVE HEART WITHOUT ANGINA PECTORIS: Primary | ICD-10-CM

## 2019-04-19 DIAGNOSIS — E78.2 MIXED HYPERLIPIDEMIA: ICD-10-CM

## 2019-04-19 PROCEDURE — 99215 OFFICE O/P EST HI 40 MIN: CPT | Performed by: INTERNAL MEDICINE

## 2019-04-19 ASSESSMENT — MIFFLIN-ST. JEOR: SCORE: 1832.36

## 2019-04-19 NOTE — PROGRESS NOTES
HPI and Plan:-    Today I had the pleasure of seeing Ander Doyle at [facility] in [place]. He is a pleasant 53 year old patient with a history of coronary disease status post PCI of the first diagonal 2.5 x 24 mm with a Promus SHARONDA and mid LAD with 4.0 x 24 mm Promus SHARONDA in 2015.  He also has  of the RCA with left-to-right collaterals.  He has a history of hypertension, hyperlipidemia and mild to moderate left iliac artery disease as well for which he was seen recently by Dr. Augustine bañuelos who is decreased metoprolol from 50 mg to 25 mg p.o. Daily.    The patient recently underwent a stress echocardiogram yesterday for work-related purposes.  The echocardiogram did not show any evidence of reversible myocardial ischemia and the patient was able to exercise is 8-1/2 minutes achieving moderate workload.  He did not have any symptoms during the stress.    He otherwise feels fine today and reports that decreasing metoprolol dose from 50 to 20 has helped him immensely.  This is led to decreasing the amount of tightness in the left thigh and increase in exercise capacity.  He otherwise does not have any complaints today.  He denies chest pain, shortness of breath, palpitation, orthopnea, PND, dizziness or lightheadedness.    Assessment and plan  1.  Coronary disease status post PCI of LAD and diagonal and  of RCA  2.  Hyperlipidemia  3.  Hypertension  4.  Mild to moderate peripheral arterial disease    Discussion  The patient has a history of coronary disease which I think is fairly well controlled at this time.  His LDL is less than 50 mg/dL and he is on 40 mg of Crestor.  He has good exercise tolerance and was able to excise for her 8-1/2 minutes on Colby protocol.  I think he is fairly fit to continue his job without restriction. I will continue him on the current dose of Crestor.  I will also continue him on the other medications at the current dose.  I will have him come back and  see me in the clinic in a year from now with a transthoracic echocardiogram to establish baseline.  I told him to call and let us know if he develops any symptoms in which case i will see him sooner.      Plan  1.  Fit to perform current job without restriction  2.  Continue to take other medications as prescribed  3.  Return to clinic in 1 year with a echocardiogram    Thank you for allowing me to participate in the care of Ander Tyson MD  Cardiology    Orders Placed This Encounter   Procedures     Follow-Up with Cardiologist     Echocardiogram Complete       Encounter Diagnoses   Name Primary?     Coronary artery disease involving native coronary artery of native heart without angina pectoris Yes     Benign essential hypertension      Mixed hyperlipidemia        CURRENT MEDICATIONS:  Current Outpatient Medications   Medication Sig Dispense Refill     aspirin 81 MG chewable tablet Take 1 tablet (81 mg) by mouth daily 36 tablet 0     metoprolol succinate ER (TOPROL-XL) 25 MG 24 hr tablet Take 1 tablet (25 mg) by mouth daily 90 tablet 2     nitroGLYcerin (NITROSTAT) 0.4 MG sublingual tablet Place 1 tablet (0.4 mg) under the tongue every 5 minutes as needed for chest pain. 25 tablet 0     rosuvastatin (CRESTOR) 40 MG tablet Take 1 tablet (40 mg) by mouth daily 90 tablet 3       ALLERGIES   No Known Allergies    PAST MEDICAL HISTORY:  Past Medical History:   Diagnosis Date     Angina pectoris (H)      Benign essential hypertension      CAD (coronary artery disease) 3/2015    cardiac cath 3/10/15: SHARONDA to LAD, SHARONDA to 1st diagonal, cardiac cath 3/7/15: BMS x2 to RCA, cardiac cath 3/6/15: SHARONDA to RCA, SHARONDA to circumflex, SHARONDA to OM     Hyperlipidemia LDL goal <70      STEMI (ST elevation myocardial infarction) (H)     inferior March 2015       PAST SURGICAL HISTORY:  Past Surgical History:   Procedure Laterality Date     HEART CATH STENT COR W/WO PTCA  3/6/2015    aspiration thrombectomy and PCI with  BMS in prox and mid RCA, 80% LAD, CFX stents patent (prox CFX and OM branch)     HEART CATH STENT COR W/WO PTCA  3/7/2015    SHARONDA 1st diagonal and SHARONDA mid LAD       FAMILY HISTORY:  Family History   Problem Relation Age of Onset     Arrhythmia Father        SOCIAL HISTORY:  Social History     Socioeconomic History     Marital status: Single     Spouse name: None     Number of children: None     Years of education: None     Highest education level: None   Occupational History     None   Social Needs     Financial resource strain: None     Food insecurity:     Worry: None     Inability: None     Transportation needs:     Medical: None     Non-medical: None   Tobacco Use     Smoking status: Current Some Day Smoker     Packs/day: 1.50     Years: 30.00     Pack years: 45.00     Types: Cigarettes     Smokeless tobacco: Never Used     Tobacco comment: quit 3/6/15 -  occas. has one   Substance and Sexual Activity     Alcohol use: Yes     Alcohol/week: 0.0 oz     Comment: rare     Drug use: No     Sexual activity: None   Lifestyle     Physical activity:     Days per week: None     Minutes per session: None     Stress: None   Relationships     Social connections:     Talks on phone: None     Gets together: None     Attends Latter-day service: None     Active member of club or organization: None     Attends meetings of clubs or organizations: None     Relationship status: None     Intimate partner violence:     Fear of current or ex partner: None     Emotionally abused: None     Physically abused: None     Forced sexual activity: None   Other Topics Concern     Parent/sibling w/ CABG, MI or angioplasty before 65F 55M? Not Asked      Service Not Asked     Blood Transfusions Not Asked     Caffeine Concern No     Comment: 1 can of pop daily     Occupational Exposure Not Asked     Hobby Hazards Not Asked     Sleep Concern No     Comment: sometimes     Stress Concern No     Weight Concern No     Special Diet No     Comment:  "more lean meats, more greens     Back Care Not Asked     Exercise No     Bike Helmet Not Asked     Seat Belt Not Asked     Self-Exams Not Asked   Social History Narrative     None       Review of Systems:  Skin:  Negative       Eyes:  Negative      ENT:  Negative      Respiratory:  Negative       Cardiovascular:  Negative      Gastroenterology: Negative      Genitourinary:  Negative      Musculoskeletal:  Positive for   leg pain  Neurologic:  Positive for numbness or tingling of feet    Psychiatric:  Negative      Heme/Lymph/Imm:  Negative      Endocrine:  Positive for night sweats      Physical Exam:  Vitals: /85   Pulse 71   Ht 1.854 m (6' 1\")   Wt 94.3 kg (208 lb)   BMI 27.44 kg/m       Constitutional: awake, alert, no distress  Skin: Warm and dry to touch  Head: Normocephalic, atraumatic  Eyes: Conjunctivae and lids unremarkable, sclera white  ENT: No pallor or cyanosis  Neck: Carotid pulses are full and equal bilaterally.  Respiratory: Normal breath sounds, clear to auscultation, no use of sensory muscles, no wheezing or crepts  Cardiac: Regular rate and rhythm, S1-S2 normal.  No murmurs gallops or rubs.  2+ pulses in RLE and 1-2+ in LLE. No pedal edema.   Abdomen:  soft and nontender.  Extremities and musculoskeletal: No gross motor deficit  Neurological.  Affect normal  Psych: Alert and oriented x3    Recent Lab Results:  LIPID RESULTS:  Lab Results   Component Value Date    CHOL 111 04/18/2019    HDL 30 (L) 04/18/2019    LDL 47 04/18/2019    TRIG 169 (H) 04/18/2019    CHOLHDLRATIO 4.2 07/15/2015       LIVER ENZYME RESULTS:  Lab Results   Component Value Date    ALT 6 04/18/2019       CBC RESULTS:  Lab Results   Component Value Date    WBC 10.4 06/19/2015    RBC 5.20 06/19/2015    HGB 16.1 06/19/2015    HCT 46.5 06/19/2015    MCV 89 06/19/2015    MCH 31.0 06/19/2015    MCHC 34.6 06/19/2015    RDW 13.6 06/19/2015     06/19/2015       BMP RESULTS:  Lab Results   Component Value Date     " 04/18/2019    POTASSIUM 4.1 04/18/2019    CHLORIDE 103 04/18/2019    CO2 25 04/18/2019    ANIONGAP 12.1 04/18/2019     (H) 04/18/2019    BUN 6 (L) 04/18/2019    CR 1.01 04/18/2019    GFRESTIMATED 77 04/18/2019    GFRESTBLACK >90 04/18/2019    VOLODYMYR 9.9 04/18/2019        A1C RESULTS:  Lab Results   Component Value Date    A1C 8.4 (H) 08/08/2018       INR RESULTS:  Lab Results   Component Value Date    INR 0.95 03/06/2015       CC  Jose Yip MD  [facility]  [address] [place] [address]    All medical records were reviewed in detail and discussed with the patient. Greater than 45 mins were spent with the patient, 50% of this time was spent on counseling and coordination of care.  After visit summary was printed and given to the patient.

## 2019-04-19 NOTE — PROGRESS NOTES
A signed letter from Dr. Tyson approving the patient for D.O.T. Recertification was faxed to [facility] in [place] at [phone] per patient request following his office visit today. MARIA ELENA Metzger RN - 04/19/19, 11:12 AM

## 2019-04-19 NOTE — LETTER
4/19/2019    Jose Yip MD  [facility] [address]  [facility] [address]    RE: Ander AMADOR Vivek       Dear Colleague,    I had the pleasure of seeing Ander Doyle in the [facility].    HPI and Plan:-    Today I had the pleasure of seeing Ander Doyle at [facility] in [place]. He is a pleasant 53 year old patient with a history of coronary disease status post PCI of the first diagonal 2.5 x 24 mm with a Promus SHARONDA and mid LAD with 4.0 x 24 mm Promus SHARONDA in 2015.  He also has  of the RCA with left-to-right collaterals.  He has a history of hypertension, hyperlipidemia and mild to moderate left iliac artery disease as well for which he was seen recently by Dr. Augustine bañuelos who is decreased metoprolol from 50 mg to 25 mg p.o. Daily.    The patient recently underwent a stress echocardiogram yesterday for work-related purposes.  The echocardiogram did not show any evidence of reversible myocardial ischemia and the patient was able to exercise is 8-1/2 minutes achieving moderate workload.  He did not have any symptoms during the stress.    He otherwise feels fine today and reports that decreasing metoprolol dose from 50 to 20 has helped him immensely.  This is led to decreasing the amount of tightness in the left thigh and increase in exercise capacity.  He otherwise does not have any complaints today.  He denies chest pain, shortness of breath, palpitation, orthopnea, PND, dizziness or lightheadedness.    Assessment and plan  1.  Coronary disease status post PCI of LAD and diagonal and  of RCA  2.  Hyperlipidemia  3.  Hypertension  4.  Mild to moderate peripheral arterial disease    Discussion  The patient has a history of coronary disease which I think is fairly well controlled at this time.  His LDL is less than 50 mg/dL and he is on 40 mg of Crestor.  He has good exercise tolerance and was able to excise for her  8-1/2 minutes on Colby protocol.  I think he is fairly fit to continue his job without restriction. I will continue him on the current dose of Crestor.  I will also continue him on the other medications at the current dose.  I will have him come back and see me in the clinic in a year from now with a transthoracic echocardiogram to establish baseline.  I told him to call and let us know if he develops any symptoms in which case i will see him sooner.      Plan  1.  Fit to perform current job without restriction  2.  Continue to take other medications as prescribed  3.  Return to clinic in 1 year with a echocardiogram    Thank you for allowing me to participate in the care of Ander Tyson MD  Cardiology    Orders Placed This Encounter   Procedures     Follow-Up with Cardiologist     Echocardiogram Complete       Encounter Diagnoses   Name Primary?     Coronary artery disease involving native coronary artery of native heart without angina pectoris Yes     Benign essential hypertension      Mixed hyperlipidemia        CURRENT MEDICATIONS:  Current Outpatient Medications   Medication Sig Dispense Refill     aspirin 81 MG chewable tablet Take 1 tablet (81 mg) by mouth daily 36 tablet 0     metoprolol succinate ER (TOPROL-XL) 25 MG 24 hr tablet Take 1 tablet (25 mg) by mouth daily 90 tablet 2     nitroGLYcerin (NITROSTAT) 0.4 MG sublingual tablet Place 1 tablet (0.4 mg) under the tongue every 5 minutes as needed for chest pain. 25 tablet 0     rosuvastatin (CRESTOR) 40 MG tablet Take 1 tablet (40 mg) by mouth daily 90 tablet 3       ALLERGIES   No Known Allergies    PAST MEDICAL HISTORY:  Past Medical History:   Diagnosis Date     Angina pectoris (H)      Benign essential hypertension      CAD (coronary artery disease) 3/2015    cardiac cath 3/10/15: SHARONDA to LAD, SHARONDA to 1st diagonal, cardiac cath 3/7/15: BMS x2 to RCA, cardiac cath 3/6/15: SHARONDA to RCA, SHARONDA to circumflex, SHARONDA to OM     Hyperlipidemia  LDL goal <70      STEMI (ST elevation myocardial infarction) (H)     inferior March 2015       PAST SURGICAL HISTORY:  Past Surgical History:   Procedure Laterality Date     HEART CATH STENT COR W/WO PTCA  3/6/2015    aspiration thrombectomy and PCI with BMS in prox and mid RCA, 80% LAD, CFX stents patent (prox CFX and OM branch)     HEART CATH STENT COR W/WO PTCA  3/7/2015    SHARONDA 1st diagonal and SHARONDA mid LAD       FAMILY HISTORY:  Family History   Problem Relation Age of Onset     Arrhythmia Father        SOCIAL HISTORY:  Social History     Socioeconomic History     Marital status: Single     Spouse name: None     Number of children: None     Years of education: None     Highest education level: None   Occupational History     None   Social Needs     Financial resource strain: None     Food insecurity:     Worry: None     Inability: None     Transportation needs:     Medical: None     Non-medical: None   Tobacco Use     Smoking status: Current Some Day Smoker     Packs/day: 1.50     Years: 30.00     Pack years: 45.00     Types: Cigarettes     Smokeless tobacco: Never Used     Tobacco comment: quit 3/6/15 -  occas. has one   Substance and Sexual Activity     Alcohol use: Yes     Alcohol/week: 0.0 oz     Comment: rare     Drug use: No     Sexual activity: None   Lifestyle     Physical activity:     Days per week: None     Minutes per session: None     Stress: None   Relationships     Social connections:     Talks on phone: None     Gets together: None     Attends Sabianism service: None     Active member of club or organization: None     Attends meetings of clubs or organizations: None     Relationship status: None     Intimate partner violence:     Fear of current or ex partner: None     Emotionally abused: None     Physically abused: None     Forced sexual activity: None   Other Topics Concern     Parent/sibling w/ CABG, MI or angioplasty before 65F 55M? Not Asked      Service Not Asked     Blood  "Transfusions Not Asked     Caffeine Concern No     Comment: 1 can of pop daily     Occupational Exposure Not Asked     Hobby Hazards Not Asked     Sleep Concern No     Comment: sometimes     Stress Concern No     Weight Concern No     Special Diet No     Comment: more lean meats, more greens     Back Care Not Asked     Exercise No     Bike Helmet Not Asked     Seat Belt Not Asked     Self-Exams Not Asked   Social History Narrative     None       Review of Systems:  Skin:  Negative       Eyes:  Negative      ENT:  Negative      Respiratory:  Negative       Cardiovascular:  Negative      Gastroenterology: Negative      Genitourinary:  Negative      Musculoskeletal:  Positive for   leg pain  Neurologic:  Positive for numbness or tingling of feet    Psychiatric:  Negative      Heme/Lymph/Imm:  Negative      Endocrine:  Positive for night sweats      Physical Exam:  Vitals: /85   Pulse 71   Ht 1.854 m (6' 1\")   Wt 94.3 kg (208 lb)   BMI 27.44 kg/m        Constitutional: awake, alert, no distress  Skin: Warm and dry to touch  Head: Normocephalic, atraumatic  Eyes: Conjunctivae and lids unremarkable, sclera white  ENT: No pallor or cyanosis  Neck: Carotid pulses are full and equal bilaterally.  Respiratory: Normal breath sounds, clear to auscultation, no use of sensory muscles, no wheezing or crepts  Cardiac: Regular rate and rhythm, S1-S2 normal.  No murmurs gallops or rubs.   2+ pulses in RLE and 1-2+ in LLE. No pedal edema.   Abdomen:  soft and nontender.  Extremities and musculoskeletal: No gross motor deficit  Neurological.  Affect normal  Psych: Alert and oriented x3    Recent Lab Results:  LIPID RESULTS:  Lab Results   Component Value Date    CHOL 111 04/18/2019    HDL 30 (L) 04/18/2019    LDL 47 04/18/2019    TRIG 169 (H) 04/18/2019    CHOLHDLRATIO 4.2 07/15/2015       LIVER ENZYME RESULTS:  Lab Results   Component Value Date    ALT 6 04/18/2019       CBC RESULTS:  Lab Results   Component Value Date    " WBC 10.4 06/19/2015    RBC 5.20 06/19/2015    HGB 16.1 06/19/2015    HCT 46.5 06/19/2015    MCV 89 06/19/2015    MCH 31.0 06/19/2015    MCHC 34.6 06/19/2015    RDW 13.6 06/19/2015     06/19/2015       BMP RESULTS:  Lab Results   Component Value Date     04/18/2019    POTASSIUM 4.1 04/18/2019    CHLORIDE 103 04/18/2019    CO2 25 04/18/2019    ANIONGAP 12.1 04/18/2019     (H) 04/18/2019    BUN 6 (L) 04/18/2019    CR 1.01 04/18/2019    GFRESTIMATED 77 04/18/2019    GFRESTBLACK >90 04/18/2019    VOLODYMYR 9.9 04/18/2019        A1C RESULTS:  Lab Results   Component Value Date    A1C 8.4 (H) 08/08/2018       INR RESULTS:  Lab Results   Component Value Date    INR 0.95 03/06/2015       CC  Jose Yip MD  [facility]  [address] [place] [address]    All medical records were reviewed in detail and discussed with the patient. Greater than 45 mins were spent with the patient, 50% of this time was spent on counseling and coordination of care.  After visit summary was printed and given to the patient.    Thank you for allowing me to participate in the care of your patient.    Sincerely,     Woodrow Tysno MD     [facility]

## 2019-04-23 ENCOUNTER — TELEPHONE (OUTPATIENT)
Dept: CARDIOLOGY | Facility: CLINIC | Age: 54
End: 2019-04-23

## 2019-04-23 NOTE — TELEPHONE ENCOUNTER
Call from patient, he needs to have a copy of his stress echo sent to the [employer]. Faxed report to [phone].

## 2019-09-09 DIAGNOSIS — I25.10 CORONARY ARTERY DISEASE INVOLVING NATIVE CORONARY ARTERY OF NATIVE HEART WITHOUT ANGINA PECTORIS: Chronic | ICD-10-CM

## 2019-09-09 RX ORDER — ROSUVASTATIN CALCIUM 40 MG/1
40 TABLET, COATED ORAL DAILY
Qty: 90 TABLET | Refills: 2 | Status: SHIPPED | OUTPATIENT
Start: 2019-09-09 | End: 2020-06-29

## 2019-11-19 DIAGNOSIS — I21.11 ST ELEVATION MYOCARDIAL INFARCTION INVOLVING RIGHT CORONARY ARTERY (H): ICD-10-CM

## 2019-11-19 DIAGNOSIS — I24.9 ACS (ACUTE CORONARY SYNDROME) (H): ICD-10-CM

## 2019-11-19 DIAGNOSIS — I10 BENIGN ESSENTIAL HYPERTENSION: ICD-10-CM

## 2019-11-19 RX ORDER — METOPROLOL SUCCINATE 25 MG/1
25 TABLET, EXTENDED RELEASE ORAL DAILY
Qty: 90 TABLET | Refills: 1 | Status: SHIPPED | OUTPATIENT
Start: 2019-11-19 | End: 2020-06-12

## 2020-06-12 ENCOUNTER — TELEPHONE (OUTPATIENT)
Dept: CARDIOLOGY | Facility: CLINIC | Age: 55
End: 2020-06-12

## 2020-06-12 DIAGNOSIS — I24.9 ACS (ACUTE CORONARY SYNDROME) (H): ICD-10-CM

## 2020-06-12 DIAGNOSIS — I21.11 ST ELEVATION MYOCARDIAL INFARCTION INVOLVING RIGHT CORONARY ARTERY (H): ICD-10-CM

## 2020-06-12 DIAGNOSIS — I10 BENIGN ESSENTIAL HYPERTENSION: ICD-10-CM

## 2020-06-12 RX ORDER — METOPROLOL SUCCINATE 25 MG/1
25 TABLET, EXTENDED RELEASE ORAL DAILY
Qty: 90 TABLET | Refills: 1 | Status: SHIPPED | OUTPATIENT
Start: 2020-06-12 | End: 2020-09-03

## 2020-06-12 NOTE — TELEPHONE ENCOUNTER
Spoke with patient to review Dr. Palmer's plan to not have an echo this year. He has not had labs yet this year. He can do them at his PCP clinic if we sent orders. Reviewed with patient that we would send lab orders once he has a date set. Patient verbalized understanding and agreed with plan.

## 2020-06-12 NOTE — TELEPHONE ENCOUNTER
Patient called for refill. Reviewed that he is due for annual visit. Patient would like to stay with Dr. Palmer, he saw Dr. Tyson to complete his [employer] update.  Dr. Tyson ordered an echo. Will message Dr. Palmer to see if he wants the echo done this year. Annual labs are due. Patient states he is feeling well.    He asks that we call him next week to schedule follow up as he is at work today.

## 2020-06-17 ENCOUNTER — TRANSFERRED RECORDS (OUTPATIENT)
Dept: HEALTH INFORMATION MANAGEMENT | Facility: CLINIC | Age: 55
End: 2020-06-17

## 2020-06-19 ENCOUNTER — TRANSFERRED RECORDS (OUTPATIENT)
Dept: HEALTH INFORMATION MANAGEMENT | Facility: CLINIC | Age: 55
End: 2020-06-19

## 2020-06-29 ENCOUNTER — TELEPHONE (OUTPATIENT)
Dept: CARDIOLOGY | Facility: CLINIC | Age: 55
End: 2020-06-29

## 2020-06-29 DIAGNOSIS — I25.10 CORONARY ARTERY DISEASE INVOLVING NATIVE CORONARY ARTERY OF NATIVE HEART WITHOUT ANGINA PECTORIS: Chronic | ICD-10-CM

## 2020-06-29 DIAGNOSIS — I21.02 ST ELEVATION MYOCARDIAL INFARCTION INVOLVING LEFT ANTERIOR DESCENDING (LAD) CORONARY ARTERY (H): ICD-10-CM

## 2020-06-29 DIAGNOSIS — E78.2 MIXED HYPERLIPIDEMIA: Primary | Chronic | ICD-10-CM

## 2020-06-29 RX ORDER — ROSUVASTATIN CALCIUM 40 MG/1
40 TABLET, COATED ORAL DAILY
Qty: 90 TABLET | Refills: 0 | Status: SHIPPED | OUTPATIENT
Start: 2020-06-29 | End: 2020-09-03

## 2020-06-29 NOTE — TELEPHONE ENCOUNTER
Patient called requesting a refill in Rosuvastatin 40 mg one tablet daily.  Patient agrees to call scheduling for next available [facility] visit with labs,    Refill e scribed to pharmacy.

## 2020-08-31 ENCOUNTER — PRE VISIT (OUTPATIENT)
Dept: CARDIOLOGY | Facility: CLINIC | Age: 55
End: 2020-08-31

## 2020-09-01 DIAGNOSIS — I21.11 ST ELEVATION MYOCARDIAL INFARCTION INVOLVING RIGHT CORONARY ARTERY (H): ICD-10-CM

## 2020-09-01 LAB
ANION GAP SERPL CALCULATED.3IONS-SCNC: 7 MMOL/L (ref 3–14)
BUN SERPL-MCNC: 11 MG/DL (ref 7–30)
CALCIUM SERPL-MCNC: 8.4 MG/DL (ref 8.5–10.1)
CHLORIDE SERPL-SCNC: 104 MMOL/L (ref 94–109)
CHOLEST SERPL-MCNC: 109 MG/DL
CO2 SERPL-SCNC: 24 MMOL/L (ref 20–32)
CREAT SERPL-MCNC: 0.91 MG/DL (ref 0.66–1.25)
GFR SERPL CREATININE-BSD FRML MDRD: >90 ML/MIN/{1.73_M2}
GLUCOSE SERPL-MCNC: 180 MG/DL (ref 70–99)
HDLC SERPL-MCNC: 31 MG/DL
LDLC SERPL CALC-MCNC: 28 MG/DL
NONHDLC SERPL-MCNC: 78 MG/DL
POTASSIUM SERPL-SCNC: 3.8 MMOL/L (ref 3.4–5.3)
SODIUM SERPL-SCNC: 135 MMOL/L (ref 133–144)
TRIGL SERPL-MCNC: 250 MG/DL

## 2020-09-01 PROCEDURE — 80048 BASIC METABOLIC PNL TOTAL CA: CPT | Performed by: INTERNAL MEDICINE

## 2020-09-01 PROCEDURE — 36415 COLL VENOUS BLD VENIPUNCTURE: CPT | Performed by: INTERNAL MEDICINE

## 2020-09-01 PROCEDURE — 80061 LIPID PANEL: CPT | Performed by: INTERNAL MEDICINE

## 2020-09-03 ENCOUNTER — VIRTUAL VISIT (OUTPATIENT)
Dept: CARDIOLOGY | Facility: CLINIC | Age: 55
End: 2020-09-03
Payer: COMMERCIAL

## 2020-09-03 DIAGNOSIS — E78.2 MIXED HYPERLIPIDEMIA: Chronic | ICD-10-CM

## 2020-09-03 DIAGNOSIS — E78.6 HDL DEFICIENCY: ICD-10-CM

## 2020-09-03 DIAGNOSIS — R07.89 ATYPICAL CHEST PAIN: ICD-10-CM

## 2020-09-03 DIAGNOSIS — I25.10 CORONARY ARTERY DISEASE INVOLVING NATIVE CORONARY ARTERY OF NATIVE HEART WITHOUT ANGINA PECTORIS: Primary | Chronic | ICD-10-CM

## 2020-09-03 DIAGNOSIS — I10 BENIGN ESSENTIAL HYPERTENSION: ICD-10-CM

## 2020-09-03 DIAGNOSIS — I21.02 ST ELEVATION MYOCARDIAL INFARCTION INVOLVING LEFT ANTERIOR DESCENDING (LAD) CORONARY ARTERY (H): ICD-10-CM

## 2020-09-03 DIAGNOSIS — I73.9 CLAUDICATION (H): ICD-10-CM

## 2020-09-03 DIAGNOSIS — I21.11 ST ELEVATION MYOCARDIAL INFARCTION INVOLVING RIGHT CORONARY ARTERY (H): ICD-10-CM

## 2020-09-03 PROCEDURE — 99214 OFFICE O/P EST MOD 30 MIN: CPT | Mod: 95 | Performed by: INTERNAL MEDICINE

## 2020-09-03 RX ORDER — NITROGLYCERIN 0.4 MG/1
TABLET SUBLINGUAL
Qty: 25 TABLET | Refills: 3 | Status: SHIPPED | OUTPATIENT
Start: 2020-09-03 | End: 2021-03-31

## 2020-09-03 RX ORDER — METOPROLOL SUCCINATE 25 MG/1
25 TABLET, EXTENDED RELEASE ORAL DAILY
Qty: 90 TABLET | Refills: 3 | Status: SHIPPED | OUTPATIENT
Start: 2020-09-03 | End: 2021-09-03

## 2020-09-03 RX ORDER — ROSUVASTATIN CALCIUM 40 MG/1
40 TABLET, COATED ORAL DAILY
Qty: 90 TABLET | Refills: 3 | Status: SHIPPED | OUTPATIENT
Start: 2020-09-03 | End: 2021-09-08

## 2020-09-03 NOTE — LETTER
"9/3/2020    Jose Yip MD  [facility] [address]  [facility] [address]    RE: Ander Doyle       Dear Colleague,    I had the pleasure of seeing Ander Doyle in the [facility].    Ander Doyle is a 54 year old male who is being evaluated via a billable video visit.      The patient has been notified of following:     \"This video visit will be conducted via a call between you and your physician/provider. We have found that certain health care needs can be provided without the need for an in-person physical exam.  This service lets us provide the care you need with a video conversation.  If a prescription is necessary we can send it directly to your pharmacy.  If lab work is needed we can place an order for that and you can then stop by our lab to have the test done at a later time.    Video visits are billed at different rates depending on your insurance coverage.  Please reach out to your insurance provider with any questions.    If during the course of the call the physician/provider feels a video visit is not appropriate, you will not be charged for this service.\"    Patient has given verbal consent for Video visit? Yes  How would you like to obtain your AVS? Mail a copy  If you are dropped from the video visit, the video invite should be resent to: Text to cell phone: [phone] [employer] Text  Will anyone else be joining your video visit? No       Review Of Systems  Skin: Negative  Eyes: Negative  Ears/Nose/Throat: Negative  Respiratory: Negative  Cardiovascular: Positive for seldom chest pain  Gastrointestinal: Negative  Genitourinary: Negative  Musculoskeletal: Negative  Neurologic: Positive for numbness or tingling of feet   Psychiatric: Negative  Hematologic/Lymphatic/Immunologic: Negative  Endocrine: Negative    Patient reported vitals:  BP: 127/88  Heart rate: 90  Weight: 212 lbs    Zoe Cooper CMA      Video-Visit " Details    Type of service:  Video Visit    Video Start Time: 9:14 AM  Video End Time: 9:32 AM    Originating Location (pt. Location): Home    Distant Location (provider location):  [facility]     Platform used for Video Visit: Jennifer     HISTORY OF PRESENT ILLNESS:  Mr. Doyle is a very nice 54-year-old gentleman who I first met in 03/2015 when he presented with an inferior wall myocardial infarction.  At that time he was  and smoking 1-1/2 packs of cigarettes per day.  He has hypertension, mixed hypercholesterolemia, HDL deficiency, marked hypertriglyceridemia and diet-controlled diabetes mellitus.  I took him to the Cardiac Catheterization Lab where we performed angioplasty and stenting on a severely diseased right coronary artery.  He also had disease in his circumflex and left anterior descending artery.  He had a distal embolization of clot.  I performed aspiration thrombectomy and left him with some distal emboli in his posterolateral branches.  He initially had no-reflow phenomenon which responded to medications.  I then proceeded to intervene on his circumflex coronary artery and obtuse marginal.  This left him with an 80% mid LAD stenosis and 80% stenosis of the first diagonal with a plan to return in a staged fashion.      That night; however, despite being on Integrilin and Brilinta, he developed stent thrombosis and was taken back to the Cardiac Catheterization Lab by my partner, where his right coronaries were further dilated and an additional stent was placed with apparent good result. He continued to flounder.  Thinking that his symptoms were due to his left anterior descending artery, we brought back to the Cath Lab where again his right coronary artery was found to be closed.        After review of the films and discussion amongst the interventionalists, we decided not to make any further attempts at opening his right coronary artery and  proceeded to stenting of his left anterior descending artery and diagonal.  He continued to have atypical chest discomfort.  He also had problems with various medications.  He was short of breath on Brilinta, so we switched him to Effient.  He had problems with cold feet and lightheadedness. We backed off on his beta blockers with which his symptoms appear to improve.      He is also been seen by my partner Augustine Hernández for his claudication which are was treated primarily by decreasing his metoprolol further.     For all intents and purposes, he states he quit smoking cigarettes.  He states he still has an occasional cigarette if it is a snowy evening when he driving.  He states this calms his nerves, but otherwise he is not smoking at all.      Due to Covid pandemic a video visit was conducted today.  Osiel has no symptoms to suggest a Covid infection.  He is practicing social distancing.  Unfortunately he is not exercising at all and he states he is been too busy is not even golfing.  He continues to have an occasional atypical chest pain that comes on at rest.  He never has any exertional chest arm neck jaw or shoulder discomfort.  He has no dyspnea on exertion orthopnea or PND.  No palpitations lightheadedness dizziness syncope or near syncope. He notes no side effects or problems with his current medical regimen.      He also states he is not having any of his hip or buttock pain anymore.     In talking to him he does not drink coffee so he drinks a lot of Mountain Dew.  He does not follow a low carbohydrate diet.     ASSESSMENT AND PLAN:  I do not think Ander's chest pain is ischemic.  He has had this ever since his intervention.    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  Last one was in 2019 and again demonstrates inferior infarct without apparent ischemia.  I assume his right coronary artery is still closed and collateral dependent.  His ejection fraction was estimated to be 60 to 70%.      Blood pressure is well controlled at 127/88     Weight is 212 pounds, giving a body mass index of 28.2 and I told him he needs to watch his weight.      I have admonished him to stay off cigarettes altogether.      Fasting lipid profile is excellent except for his persistent hypertriglyceridemia and HDL deficiency.  Total cholesterol is 109 and LDL was all the way down to 28.  I talked about the importance of getting rid of his Mountain Dew, low carbohydrate diet, regular exercise and weight loss.  His fasting glucose is 180.  I will leave that to his primary care doctor to address.     Claudication appears to be doing well.  Again regular exercise, low carbohydrate diet and weight loss will help with this as well.     I refilled all of his medications including his nitroglycerin which she never takes.  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  I will set this up as well as follow-up with my CURTIS.  I will see him a year from then.  DOT is asking for stress test every other year.    Thank you for allowing me to participate in his care.  Sincerely,      CHRISTIANO PADRON MD, [facility]

## 2020-09-03 NOTE — PROGRESS NOTES
"Ander Doyle is a 54 year old male who is being evaluated via a billable video visit.      The patient has been notified of following:     \"This video visit will be conducted via a call between you and your physician/provider. We have found that certain health care needs can be provided without the need for an in-person physical exam.  This service lets us provide the care you need with a video conversation.  If a prescription is necessary we can send it directly to your pharmacy.  If lab work is needed we can place an order for that and you can then stop by our lab to have the test done at a later time.    Video visits are billed at different rates depending on your insurance coverage.  Please reach out to your insurance provider with any questions.    If during the course of the call the physician/provider feels a video visit is not appropriate, you will not be charged for this service.\"    Patient has given verbal consent for Video visit? Yes  How would you like to obtain your AVS? Mail a copy  If you are dropped from the video visit, the video invite should be resent to: Text to cell phone: [phone] [employer] Text  Will anyone else be joining your video visit? No       Review Of Systems  Skin: Negative  Eyes: Negative  Ears/Nose/Throat: Negative  Respiratory: Negative  Cardiovascular: Positive for seldom chest pain  Gastrointestinal: Negative  Genitourinary: Negative  Musculoskeletal: Negative  Neurologic: Positive for numbness or tingling of feet   Psychiatric: Negative  Hematologic/Lymphatic/Immunologic: Negative  Endocrine: Negative    Patient reported vitals:  BP: 127/88  Heart rate: 90  Weight: 212 lbs    Zoe Cooper CMA      Video-Visit Details    Type of service:  Video Visit    Video Start Time: 9:14 AM  Video End Time: 9:32 AM    Originating Location (pt. Location): Home    Distant Location (provider location):  [facility]     Platform used for Video " Visit: Jennifer     HISTORY OF PRESENT ILLNESS:  Mr. Doyle is a very nice 54-year-old gentleman who I first met in 03/2015 when he presented with an inferior wall myocardial infarction.  At that time he was  and smoking 1-1/2 packs of cigarettes per day.  He has hypertension, mixed hypercholesterolemia, HDL deficiency, marked hypertriglyceridemia and diet-controlled diabetes mellitus.  I took him to the Cardiac Catheterization Lab where we performed angioplasty and stenting on a severely diseased right coronary artery.  He also had disease in his circumflex and left anterior descending artery.  He had a distal embolization of clot.  I performed aspiration thrombectomy and left him with some distal emboli in his posterolateral branches.  He initially had no-reflow phenomenon which responded to medications.  I then proceeded to intervene on his circumflex coronary artery and obtuse marginal.  This left him with an 80% mid LAD stenosis and 80% stenosis of the first diagonal with a plan to return in a staged fashion.      That night; however, despite being on Integrilin and Brilinta, he developed stent thrombosis and was taken back to the Cardiac Catheterization Lab by my partner, where his right coronaries were further dilated and an additional stent was placed with apparent good result. He continued to flounder.  Thinking that his symptoms were due to his left anterior descending artery, we brought back to the Cath Lab where again his right coronary artery was found to be closed.        After review of the films and discussion amongst the interventionalists, we decided not to make any further attempts at opening his right coronary artery and proceeded to stenting of his left anterior descending artery and diagonal.  He continued to have atypical chest discomfort.  He also had problems with various medications.  He was short of breath on Brilinta, so we switched him to Effient.  He had problems with cold  feet and lightheadedness. We backed off on his beta blockers with which his symptoms appear to improve.      He is also been seen by my partner Augustine Hernández for his claudication which are was treated primarily by decreasing his metoprolol further.     For all intents and purposes, he states he quit smoking cigarettes.  He states he still has an occasional cigarette if it is a snowy evening when he driving.  He states this calms his nerves, but otherwise he is not smoking at all.      Due to Covid pandemic a video visit was conducted today.  Osiel has no symptoms to suggest a Covid infection.  He is practicing social distancing.  Unfortunately he is not exercising at all and he states he is been too busy is not even golfing.  He continues to have an occasional atypical chest pain that comes on at rest.  He never has any exertional chest arm neck jaw or shoulder discomfort.  He has no dyspnea on exertion orthopnea or PND.  No palpitations lightheadedness dizziness syncope or near syncope. He notes no side effects or problems with his current medical regimen.      He also states he is not having any of his hip or buttock pain anymore.     In talking to him he does not drink coffee so he drinks a lot of Mountain Dew.  He does not follow a low carbohydrate diet.     ASSESSMENT AND PLAN:  I do not think Ander's chest pain is ischemic.  He has had this ever since his intervention.    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  Last one was in 2019 and again demonstrates inferior infarct without apparent ischemia.  I assume his right coronary artery is still closed and collateral dependent.  His ejection fraction was estimated to be 60 to 70%.     Blood pressure is well controlled at 127/88     Weight is 212 pounds, giving a body mass index of 28.2 and I told him he needs to watch his weight.      I have admonished him to stay off cigarettes altogether.      Fasting lipid profile is excellent except for his  persistent hypertriglyceridemia and HDL deficiency.  Total cholesterol is 109 and LDL was all the way down to 28.  I talked about the importance of getting rid of his Mountain Dew, low carbohydrate diet, regular exercise and weight loss.  His fasting glucose is 180.  I will leave that to his primary care doctor to address.     Claudication appears to be doing well.  Again regular exercise, low carbohydrate diet and weight loss will help with this as well.     I refilled all of his medications including his nitroglycerin which she never takes.  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  I will set this up as well as follow-up with my CURTIS.  I will see him a year from then.  DOT is asking for stress test every other year.    Thank you for allowing me to participate in his care.  Sincerely,        CHRISTIANO PADRON MD, FACC

## 2020-09-04 ENCOUNTER — DOCUMENTATION ONLY (OUTPATIENT)
Dept: CARDIOLOGY | Facility: CLINIC | Age: 55
End: 2020-09-04

## 2020-09-04 NOTE — PROGRESS NOTES
Message from Dr. Palmer:  Make sure his primary care gets a copy of his lab work including his glucose and triglycerides.     9/4/2020 copy of lab work 9/1/2020 and office note 9/3/2020 faxed to Dr. Yip

## 2021-02-03 ENCOUNTER — APPOINTMENT (OUTPATIENT)
Dept: CARDIOLOGY | Facility: CLINIC | Age: 56
End: 2021-02-03
Attending: PHYSICIAN ASSISTANT
Payer: COMMERCIAL

## 2021-02-03 ENCOUNTER — HOSPITAL ENCOUNTER (OUTPATIENT)
Facility: CLINIC | Age: 56
Setting detail: OBSERVATION
Discharge: HOME | End: 2021-02-03
Attending: EMERGENCY MEDICINE | Admitting: HOSPITALIST
Payer: COMMERCIAL

## 2021-02-03 ENCOUNTER — APPOINTMENT (OUTPATIENT)
Dept: GENERAL RADIOLOGY | Facility: CLINIC | Age: 56
End: 2021-02-03
Attending: EMERGENCY MEDICINE
Payer: COMMERCIAL

## 2021-02-03 VITALS
OXYGEN SATURATION: 96 % | TEMPERATURE: 98 F | RESPIRATION RATE: 16 BRPM | BODY MASS INDEX: 26.89 KG/M2 | SYSTOLIC BLOOD PRESSURE: 136 MMHG | DIASTOLIC BLOOD PRESSURE: 86 MMHG | WEIGHT: 202.9 LBS | HEIGHT: 73 IN | HEART RATE: 69 BPM

## 2021-02-03 DIAGNOSIS — I25.10 CORONARY ARTERY DISEASE INVOLVING NATIVE CORONARY ARTERY OF NATIVE HEART WITHOUT ANGINA PECTORIS: Chronic | ICD-10-CM

## 2021-02-03 DIAGNOSIS — R73.9 HYPERGLYCEMIA: ICD-10-CM

## 2021-02-03 DIAGNOSIS — R07.9 CHEST PAIN, UNSPECIFIED TYPE: ICD-10-CM

## 2021-02-03 DIAGNOSIS — E78.2 MIXED HYPERLIPIDEMIA: Primary | Chronic | ICD-10-CM

## 2021-02-03 DIAGNOSIS — Z95.5 HISTORY OF CORONARY ARTERY STENT PLACEMENT: ICD-10-CM

## 2021-02-03 LAB
ANION GAP SERPL CALCULATED.3IONS-SCNC: 5 MMOL/L (ref 3–14)
BASOPHILS # BLD AUTO: 0.1 10E9/L (ref 0–0.2)
BASOPHILS NFR BLD AUTO: 0.9 %
BUN SERPL-MCNC: 12 MG/DL (ref 7–30)
CALCIUM SERPL-MCNC: 8.4 MG/DL (ref 8.5–10.1)
CHLORIDE SERPL-SCNC: 103 MMOL/L (ref 94–109)
CO2 SERPL-SCNC: 28 MMOL/L (ref 20–32)
CREAT SERPL-MCNC: 0.89 MG/DL (ref 0.66–1.25)
DIFFERENTIAL METHOD BLD: ABNORMAL
EOSINOPHIL # BLD AUTO: 0.3 10E9/L (ref 0–0.7)
EOSINOPHIL NFR BLD AUTO: 2.8 %
ERYTHROCYTE [DISTWIDTH] IN BLOOD BY AUTOMATED COUNT: 11.9 % (ref 10–15)
GFR SERPL CREATININE-BSD FRML MDRD: >90 ML/MIN/{1.73_M2}
GLUCOSE SERPL-MCNC: 249 MG/DL (ref 70–99)
HCT VFR BLD AUTO: 50.8 % (ref 40–53)
HGB BLD-MCNC: 17.2 G/DL (ref 13.3–17.7)
IMM GRANULOCYTES # BLD: 0 10E9/L (ref 0–0.4)
IMM GRANULOCYTES NFR BLD: 0.2 %
INTERPRETATION ECG - MUSE: NORMAL
LABORATORY COMMENT REPORT: NORMAL
LYMPHOCYTES # BLD AUTO: 3.6 10E9/L (ref 0.8–5.3)
LYMPHOCYTES NFR BLD AUTO: 31.2 %
MCH RBC QN AUTO: 31.7 PG (ref 26.5–33)
MCHC RBC AUTO-ENTMCNC: 33.9 G/DL (ref 31.5–36.5)
MCV RBC AUTO: 94 FL (ref 78–100)
MONOCYTES # BLD AUTO: 1 10E9/L (ref 0–1.3)
MONOCYTES NFR BLD AUTO: 8.5 %
NEUTROPHILS # BLD AUTO: 6.5 10E9/L (ref 1.6–8.3)
NEUTROPHILS NFR BLD AUTO: 56.4 %
NRBC # BLD AUTO: 0 10*3/UL
NRBC BLD AUTO-RTO: 0 /100
PLATELET # BLD AUTO: 218 10E9/L (ref 150–450)
POTASSIUM SERPL-SCNC: 3.8 MMOL/L (ref 3.4–5.3)
RBC # BLD AUTO: 5.43 10E12/L (ref 4.4–5.9)
SARS-COV-2 RNA RESP QL NAA+PROBE: NEGATIVE
SODIUM SERPL-SCNC: 136 MMOL/L (ref 133–144)
SPECIMEN SOURCE: NORMAL
TROPONIN I SERPL-MCNC: <0.015 UG/L (ref 0–0.04)
TROPONIN I SERPL-MCNC: <0.015 UG/L (ref 0–0.04)
WBC # BLD AUTO: 11.4 10E9/L (ref 4–11)

## 2021-02-03 PROCEDURE — C9803 HOPD COVID-19 SPEC COLLECT: HCPCS

## 2021-02-03 PROCEDURE — 250N000013 HC RX MED GY IP 250 OP 250 PS 637: Performed by: EMERGENCY MEDICINE

## 2021-02-03 PROCEDURE — G0378 HOSPITAL OBSERVATION PER HR: HCPCS

## 2021-02-03 PROCEDURE — 71045 X-RAY EXAM CHEST 1 VIEW: CPT

## 2021-02-03 PROCEDURE — 99285 EMERGENCY DEPT VISIT HI MDM: CPT | Mod: 25

## 2021-02-03 PROCEDURE — 93321 DOPPLER ECHO F-UP/LMTD STD: CPT | Mod: 26 | Performed by: INTERNAL MEDICINE

## 2021-02-03 PROCEDURE — 93325 DOPPLER ECHO COLOR FLOW MAPG: CPT | Mod: 26 | Performed by: INTERNAL MEDICINE

## 2021-02-03 PROCEDURE — 87635 SARS-COV-2 COVID-19 AMP PRB: CPT | Performed by: EMERGENCY MEDICINE

## 2021-02-03 PROCEDURE — 93306 TTE W/DOPPLER COMPLETE: CPT | Mod: 26 | Performed by: INTERNAL MEDICINE

## 2021-02-03 PROCEDURE — 84484 ASSAY OF TROPONIN QUANT: CPT | Performed by: EMERGENCY MEDICINE

## 2021-02-03 PROCEDURE — 84484 ASSAY OF TROPONIN QUANT: CPT | Mod: 91 | Performed by: PHYSICIAN ASSISTANT

## 2021-02-03 PROCEDURE — 255N000002 HC RX 255 OP 636: Performed by: HOSPITALIST

## 2021-02-03 PROCEDURE — 99236 HOSP IP/OBS SAME DATE HI 85: CPT | Performed by: PHYSICIAN ASSISTANT

## 2021-02-03 PROCEDURE — 999N000208 ECHOCARDIOGRAM COMPLETE

## 2021-02-03 PROCEDURE — 93018 CV STRESS TEST I&R ONLY: CPT | Performed by: INTERNAL MEDICINE

## 2021-02-03 PROCEDURE — 93350 STRESS TTE ONLY: CPT | Mod: 26 | Performed by: INTERNAL MEDICINE

## 2021-02-03 PROCEDURE — 36415 COLL VENOUS BLD VENIPUNCTURE: CPT | Performed by: PHYSICIAN ASSISTANT

## 2021-02-03 PROCEDURE — 80048 BASIC METABOLIC PNL TOTAL CA: CPT | Performed by: EMERGENCY MEDICINE

## 2021-02-03 PROCEDURE — 85025 COMPLETE CBC W/AUTO DIFF WBC: CPT | Performed by: EMERGENCY MEDICINE

## 2021-02-03 PROCEDURE — 93016 CV STRESS TEST SUPVJ ONLY: CPT | Performed by: INTERNAL MEDICINE

## 2021-02-03 PROCEDURE — 93005 ELECTROCARDIOGRAM TRACING: CPT

## 2021-02-03 PROCEDURE — 93325 DOPPLER ECHO COLOR FLOW MAPG: CPT | Mod: TC

## 2021-02-03 RX ORDER — KETOROLAC TROMETHAMINE 15 MG/ML
15 INJECTION, SOLUTION INTRAMUSCULAR; INTRAVENOUS EVERY 6 HOURS PRN
Status: DISCONTINUED | OUTPATIENT
Start: 2021-02-03 | End: 2021-02-03 | Stop reason: HOSPADM

## 2021-02-03 RX ORDER — PROCHLORPERAZINE 25 MG
25 SUPPOSITORY, RECTAL RECTAL EVERY 12 HOURS PRN
Status: DISCONTINUED | OUTPATIENT
Start: 2021-02-03 | End: 2021-02-03 | Stop reason: HOSPADM

## 2021-02-03 RX ORDER — LIDOCAINE 40 MG/G
CREAM TOPICAL
Status: DISCONTINUED | OUTPATIENT
Start: 2021-02-03 | End: 2021-02-03 | Stop reason: HOSPADM

## 2021-02-03 RX ORDER — ACETAMINOPHEN 325 MG/1
650 TABLET ORAL EVERY 4 HOURS PRN
Status: DISCONTINUED | OUTPATIENT
Start: 2021-02-03 | End: 2021-02-03 | Stop reason: HOSPADM

## 2021-02-03 RX ORDER — NALOXONE HYDROCHLORIDE 0.4 MG/ML
0.4 INJECTION, SOLUTION INTRAMUSCULAR; INTRAVENOUS; SUBCUTANEOUS
Status: DISCONTINUED | OUTPATIENT
Start: 2021-02-03 | End: 2021-02-03 | Stop reason: HOSPADM

## 2021-02-03 RX ORDER — ONDANSETRON 4 MG/1
4 TABLET, ORALLY DISINTEGRATING ORAL EVERY 6 HOURS PRN
Status: DISCONTINUED | OUTPATIENT
Start: 2021-02-03 | End: 2021-02-03 | Stop reason: HOSPADM

## 2021-02-03 RX ORDER — AMOXICILLIN 250 MG
2 CAPSULE ORAL 2 TIMES DAILY PRN
Status: DISCONTINUED | OUTPATIENT
Start: 2021-02-03 | End: 2021-02-03 | Stop reason: HOSPADM

## 2021-02-03 RX ORDER — AMOXICILLIN 250 MG
1 CAPSULE ORAL 2 TIMES DAILY PRN
Status: DISCONTINUED | OUTPATIENT
Start: 2021-02-03 | End: 2021-02-03 | Stop reason: HOSPADM

## 2021-02-03 RX ORDER — NITROGLYCERIN 0.4 MG/1
0.4 TABLET SUBLINGUAL EVERY 5 MIN PRN
Status: DISCONTINUED | OUTPATIENT
Start: 2021-02-03 | End: 2021-02-03 | Stop reason: HOSPADM

## 2021-02-03 RX ORDER — BISACODYL 10 MG
10 SUPPOSITORY, RECTAL RECTAL DAILY PRN
Status: DISCONTINUED | OUTPATIENT
Start: 2021-02-03 | End: 2021-02-03 | Stop reason: HOSPADM

## 2021-02-03 RX ORDER — ASPIRIN 81 MG/1
243 TABLET, CHEWABLE ORAL ONCE
Status: COMPLETED | OUTPATIENT
Start: 2021-02-03 | End: 2021-02-03

## 2021-02-03 RX ORDER — ACETAMINOPHEN 650 MG/1
650 SUPPOSITORY RECTAL EVERY 4 HOURS PRN
Status: DISCONTINUED | OUTPATIENT
Start: 2021-02-03 | End: 2021-02-03 | Stop reason: HOSPADM

## 2021-02-03 RX ORDER — MAGNESIUM HYDROXIDE/ALUMINUM HYDROXICE/SIMETHICONE 120; 1200; 1200 MG/30ML; MG/30ML; MG/30ML
30 SUSPENSION ORAL EVERY 4 HOURS PRN
Status: DISCONTINUED | OUTPATIENT
Start: 2021-02-03 | End: 2021-02-03 | Stop reason: HOSPADM

## 2021-02-03 RX ORDER — PROCHLORPERAZINE MALEATE 5 MG
10 TABLET ORAL EVERY 6 HOURS PRN
Status: DISCONTINUED | OUTPATIENT
Start: 2021-02-03 | End: 2021-02-03 | Stop reason: HOSPADM

## 2021-02-03 RX ORDER — ONDANSETRON 2 MG/ML
4 INJECTION INTRAMUSCULAR; INTRAVENOUS EVERY 6 HOURS PRN
Status: DISCONTINUED | OUTPATIENT
Start: 2021-02-03 | End: 2021-02-03 | Stop reason: HOSPADM

## 2021-02-03 RX ORDER — MORPHINE SULFATE 2 MG/ML
2-4 INJECTION, SOLUTION INTRAMUSCULAR; INTRAVENOUS
Status: DISCONTINUED | OUTPATIENT
Start: 2021-02-03 | End: 2021-02-03 | Stop reason: HOSPADM

## 2021-02-03 RX ORDER — NALOXONE HYDROCHLORIDE 0.4 MG/ML
0.2 INJECTION, SOLUTION INTRAMUSCULAR; INTRAVENOUS; SUBCUTANEOUS
Status: DISCONTINUED | OUTPATIENT
Start: 2021-02-03 | End: 2021-02-03 | Stop reason: HOSPADM

## 2021-02-03 RX ORDER — NITROGLYCERIN 0.4 MG/1
0.4 TABLET SUBLINGUAL EVERY 5 MIN PRN
Status: DISCONTINUED | OUTPATIENT
Start: 2021-02-03 | End: 2021-02-03

## 2021-02-03 RX ADMIN — ASPIRIN 81 MG CHEWABLE TABLET 243 MG: 81 TABLET CHEWABLE at 06:25

## 2021-02-03 RX ADMIN — HUMAN ALBUMIN MICROSPHERES AND PERFLUTREN 2 ML: 10; .22 INJECTION, SOLUTION INTRAVENOUS at 15:28

## 2021-02-03 RX ADMIN — HUMAN ALBUMIN MICROSPHERES AND PERFLUTREN 9 ML: 10; .22 INJECTION, SOLUTION INTRAVENOUS at 11:42

## 2021-02-03 ASSESSMENT — ENCOUNTER SYMPTOMS
NAUSEA: 0
VOMITING: 0
DIARRHEA: 0
FEVER: 0
SHORTNESS OF BREATH: 0
DIAPHORESIS: 1
COUGH: 0

## 2021-02-03 ASSESSMENT — MIFFLIN-ST. JEOR
SCORE: 1831.43
SCORE: 1799.23

## 2021-02-03 NOTE — ED NOTES
"[facility]  ED Nurse Handoff Report    Ander Doyle is a 55 year old male   ED Chief complaint: Chest Pain  . ED Diagnosis:   Final diagnoses:   None     Allergies: No Known Allergies    Code Status: Full Code  Activity level - Baseline/Home:  Independent. Activity Level - Current:   Stand by Assist. Lift room needed: No. Bariatric: No   Needed: No   Isolation: No. Infection: Not Applicable.     Vital Signs:   Vitals:    02/03/21 0600 02/03/21 0605 02/03/21 0615 02/03/21 0645   BP: (!) 138/93  (!) 153/93 (!) 146/95   Pulse: 68 67 71 65   Resp:  12     Temp:       SpO2: 95% 95% 94% 97%   Weight:       Height:           Cardiac Rhythm:  ,   Cardiac  Cardiac Rhythm: Normal sinus rhythm  Pain level:    Patient confused: No. Patient Falls Risk: No.   Elimination Status: Has voided   Patient Report - Initial Complaint: chest pain. Focused Assessment:    06:28 Cardiac Cardiac - Cardiac WDL: .WDL except; chest pain (intermittent chest pain since MI in 2015. pt started smoking last year and just stopped 2 days ago which is when the pain got worse. ) Capillary Refill, General: less than/equal to 3 secs   Review of Systems (Cardiac) - Cardiovascular Symptoms/Conditions: chest pain; myocardial infarction   Chest Pain Assessment - Rating (0-10): 0  Chest Pain Location: midsternal  Precipitating Factors: at rest  Associated Signs/Symptoms: diaphoresis  Chest Pain Reproducible?: No   Cardiac Monitoring - EKG Monitoring: Yes  Cardiac Regularity: Regular  Cardiac Rhythm: NSR   Chest Pain Assessment - Character: pressure   Cardiac Care - Chest Pain Intervention: cardiac biomarkers drawn; cardiac monitor placed; 12-lead ECG obtained; activity minimized; aspirin given     06:32 Respiratory Respiratory - Respiratory WDL: .WDL except; rhythm/pattern  Rhythm/Pattern, Respiratory: shortness of breath (SOB \"when chest pain gets bad\") Breath Sounds: All Fields   Head To Toe Assessment - All Lung Fields Breath " Sounds: Anterior:; Posterior:; clear; equal bilaterally      Tests Performed: blood work, ekg. Abnormal Results:   Labs Ordered and Resulted from Time of ED Arrival Up to the Time of Departure from the ED   CBC WITH PLATELETS DIFFERENTIAL - Abnormal; Notable for the following components:       Result Value    WBC 11.4 (*)     All other components within normal limits   BASIC METABOLIC PANEL - Abnormal; Notable for the following components:    Glucose 249 (*)     Calcium 8.4 (*)     All other components within normal limits   TROPONIN I   PERIPHERAL IV CATHETER   CARDIAC CONTINUOUS MONITORING   PULSE OXIMETRY NURSING     XR Chest Port 1 View   Final Result   IMPRESSION: Negative chest.        .   Treatments provided: aspirin  Family Comments: patient updated family    OBS brochure/video discussed/provided to patient:  N/A  ED Medications:   Medications   nitroGLYcerin (NITROSTAT) sublingual tablet 0.4 mg (has no administration in time range)   aspirin (ASA) chewable tablet 243 mg (243 mg Oral Given 2/3/21 0625)     Drips infusing:  No  For the majority of the shift, the patient's behavior Green. Interventions performed were frequent rounding.    Sepsis treatment initiated: No     Patient tested for COVID 19 prior to admission: YES    ED Nurse Name/Phone Number: Maribell Quinn RN,   6:54 AM    RECEIVING UNIT ED HANDOFF REVIEW    Above ED Nurse Handoff Report was reviewed: Yes  Reviewed by: Lise Jenkins RN on February 3, 2021 at 8:25 AM

## 2021-02-03 NOTE — ED NOTES
Reviewed plan of care with patient for admit, repeat troponin and stress test today. Patient agrees to plan.

## 2021-02-03 NOTE — PLAN OF CARE
Patient's After Visit Summary was reviewed with patient.   Patient verbalized understanding of After Visit Summary, recommended follow up and was given an opportunity to ask questions.   Discharge medications sent home with patient/family: N/A  Discharged with: self    OBSERVATION patient END time: 1700

## 2021-02-03 NOTE — PHARMACY-ADMISSION MEDICATION HISTORY
Admission medication history interview status for this patient is complete. See [facility] admission navigator for allergy information, prior to admission medications and immunization status.     Medication history interview done via telephone during Covid-19 pandemic, indicate source(s): Patient  Medication history resources (including written lists, pill bottles, clinic record):None    Changes made to PTA medication list:  Added: none  Deleted: none  Changed: none    Actions taken by pharmacist (provider contacted, etc):None     Additional medication history information:None    Medication reconciliation/reorder completed by provider prior to medication history?  N     Prior to Admission medications    Medication Sig Last Dose Taking? Auth Provider   aspirin 81 MG chewable tablet Take 1 tablet (81 mg) by mouth daily 2/2/2021 at Unknown time Yes Cathy Tony MD   metoprolol succinate ER (TOPROL-XL) 25 MG 24 hr tablet Take 1 tablet (25 mg) by mouth daily 2/2/2021 at Unknown time Yes Claudio Palmer MD   rosuvastatin (CRESTOR) 40 MG tablet Take 1 tablet (40 mg) by mouth daily 2/2/2021 at Unknown time Yes Claudio Palmer MD   nitroGLYcerin (NITROSTAT) 0.4 MG sublingual tablet Place 1 tablet (0.4 mg) under the tongue every 5 minutes as needed for chest pain. Unknown at Unknown time  Claudio Palmer MD

## 2021-02-03 NOTE — ED PROVIDER NOTES
"  History   Chief Complaint:  Chest Pain       The history is provided by the patient.      Ander Doyle is a 55 year old male with HTN who presents with chest pain. Ander had a STEMI in 2015 and continues to follow with cardiology for this. He last had a stress test in 2019. Since his STEMI, he has had intermittent chest pains which has not been a problem for him. He did start smoking again in the Fall of 2020 and quit 3 days ago. He feels his chest pains have worsened over these 3 days which he thinks is due to smoking cessation. However, this morning between 2 and 3 hours prior to arrival, he was awoken by a dull central chest pressure which he rated as 5/10 on the pain scale. The pain then \"moved all over\" his chest. His pain was accompanied by diaphoresis, which was reminiscent of his past MI, and this prompted his ER visit. His pain has since diminished to a 1/10. He denies associated nausea, vomiting, or shortness of breath.  He further denies recent illness, fever, cough, or diarrhea. He took his usual medications this morning which included low-dose aspirin.    Review of Systems   Constitutional: Positive for diaphoresis. Negative for fever.   Respiratory: Negative for cough and shortness of breath.    Cardiovascular: Positive for chest pain (and pressure).   Gastrointestinal: Negative for diarrhea, nausea and vomiting.   All other systems reviewed and are negative.    Allergies:  No Known Drug Allergies    Medications:  aspirin 81 mg  metoprolol succinate  nitroglycerin  rosuvastatin    Past Medical History:    Essential hypertension  Hyperlipidemia  STEMI  CAD with angina pectoris    Past Surgical History:    Aspiration thrombectomy and PCI stenting  Hearth catheterization with stenting    Family History:    Father - arrhythmia    Social History:  The patient was not accompanied to the ED.  PCP: Jose Yip  Smoking Status: Former smoker - quit 3 days ago    Physical Exam     Patient Vitals for " "the past 24 hrs:   BP Temp Pulse Resp SpO2 Height Weight   02/03/21 0830 (!) 145/92 -- 66 11 -- -- --   02/03/21 0815 (!) 150/101 -- 60 -- -- -- --   02/03/21 0700 (!) 146/104 -- 64 13 95 % -- --   02/03/21 0645 (!) 146/95 -- 65 -- 97 % -- --   02/03/21 0615 (!) 153/93 -- 71 -- 94 % -- --   02/03/21 0605 -- -- 67 12 95 % -- --   02/03/21 0600 (!) 138/93 -- 68 -- 95 % -- --   02/03/21 0555 -- -- 69 13 97 % -- --   02/03/21 0550 (!) 153/100 -- -- -- -- -- --   02/03/21 0545 (!) 166/99 98.7  F (37.1  C) 75 18 95 % 1.854 m (6' 1\") 95.3 kg (210 lb)       Physical Exam  General: Well-developed and well-nourished. Well appearing middle aged  man. Cooperative.  Head:  Atraumatic.  Eyes:  Conjunctivae, lids, and sclerae are normal.  Neck:  Supple. Normal range of motion.  CV:  Regular rate and rhythm. Normal heart sounds with no murmurs, rubs, or gallops detected.  Resp:  No respiratory distress. Clear to auscultation bilaterally without decreased breath sounds, wheezing, rales, or rhonchi.  GI:  Soft. Non-distended. Non-tender.    MS:  Normal ROM. No bilateral lower extremity edema.  Skin:  Warm. Non-diaphoretic. No pallor.  Neuro:  Awake. A&OX3. Normal strength.  Psych: Normal mood and affect. Normal speech.  Vitals reviewed.    Emergency Department Course     EKG  Indication: Chest pain  Time: 0550  Rate 73 bpm. AR interval 170. QRS duration 92. QT/QTc 400/440.   Normal sinus rhythm   No acute ST changes.  No significant change as compared to prior, dated 10/12/2018.    Imaging:  XR Chest Port 1 View:  Negative chest  Report per radiology.    Laboratory:  CBC: WBC: 11.4 (H), HGB: 17.2, PLT: 218  BMP: Glucose 249 (H), Calcium 8.4 (L), o/w WNL (Creatinine: 0.89)    Troponin (0552): <0.015  Troponin (0910): <0.015    Asymptomatic COVID-19 Virus (Coronavirus) by Nasopharyngeal (NP) Swab: Negative    Emergency Department Course:    Reviewed:  I reviewed the patient's nursing notes, vitals, and past medical history. "     Assessments:  0605 I performed an exam of the patient in room 2 as documented above.  0705 Patient rechecked and updated on findings. He has no pain, feeling back to normal, and thinks he may have overreacted.    Consults:    0712 I consulted with Melanie Ryder PA-C for Dr. Soto of the hospitalist service, regarding patient, who agrees to admit patient to hospital to an observation bed.    Interventions:  0625 aspirin 243 mg PO    Disposition:  The patient was admitted to the hospital for continued observation under the care of Dr. Soto.     Impression & Plan     Covid-19  Ander Doyle was evaluated during a global COVID-19 pandemic, which necessitated consideration that the patient might be at risk for infection with the SARS-CoV-2 virus that causes COVID-19. Applicable protocols for evaluation were followed during the patient's care.   COVID-19 was considered as part of the patient's evaluation. A test was obtained during this visit prior to the patient's admission.     Medical Decision Making:  Ander is a 55 year old man with a history of MI who tells me he has frequent episodes of a somewhat atypical sounding chest pain that resolve on their own. However, this morning he had substernal chest pain that he describes as a 5 out of 10 associated with diaphoresis. He felt this episode lasted longer and was somewhat different than the other episodes (diaphoresis) which prompted his visit. By the time of arrival he tells me his pain is 1/10 and when the nurse went to give him nitroglycerin he told her his pain had completely resolved such that he did not require nitroglycerin. He was given aspirin.     His EKG is reassuring without acute ST changes or arrhythmias and the initial troponin is negative. This was obtained 2 or 3 hours after onset of symptoms. Basic laboratory studies are noncontributory with hyperglycemia noted. He does not have documented history of diabetes. There is no DKA. CXR does not  reveal alternate cause for his pain such as pneumonia, pneumothorax, or pleural effusion. Although Ander's pain resolved and he had no further complaints, with his history of MI and this chest pain with diaphoresis, I am very concerned. He has not had a stress test since 2019 and I have recommended admission for further risk stratification of this chest pain. Ander is somewhat hesitant but does agree to observation stay assuming he will be able to be discharged if his stress test is performed today and is negative. I answered all his questions and he verbalized understanding. I discussed the patient's case with Melanie Ryder, hospitalist PA, who accepts admission and has no further orders.    Diagnosis:    ICD-10-CM    1. Chest pain, unspecified type  R07.9    2. History of coronary artery stent placement  Z95.5    3. Hyperglycemia  R73.9        Scribe Disclosure:  I, Trinh Osei, am serving as a scribe at 6:05 AM on 2/3/2021 to document services personally performed by Charlette Wolff MD based on my observations and the provider's statements to me.      Trinh Osei  2/3/2021   [facility] EMERGENCY DEPARTMENT         Charlette Wolff MD  02/03/21 2611

## 2021-02-03 NOTE — DISCHARGE SUMMARY
[facility]  Discharge Summary  Hospitalist    Date of Admission:  2/3/2021  Date of Discharge:  2/3/2021    Discharging Provider: Elena Frias PAC    Discharge Diagnoses   1. Chest pain  2. ASCVD s/p STEMI 2015 requiring multiple stents  3. Hyperglycemia concerning for diabetes mellitus    History of Present Illness   Ander Doyle is a 55 year old male with ASCVD s/p interior STEMI 2015 requiring stenting of the LAD, D1, RCA, LCx, and OM1; HTN; tobacco use; and dyslipidemia who presented to [facility] ED on 2/3/2021 with sudden onset substernal chest pain waking him from sleep with associated diaphoresis.  BMP within normal limits save for glucose 249.  CBC notable for WBC 11.4.  Trop <0.015 x2.  EKG with NSR.  CXR negative.  Chest pain resolved after he got to the ER, but before he received nitro.  Admitted for a chest pain rule out.     Patient underwent Exercise Stress Echo however he was unable to achieve target heart rate (something that occurred in 2019 as well).  Echocardiogram ordered and showed normal LVSF, indeterminate diastolic function, an area of the distal inferior/lateral wall that could not be well seen.  Patient continues to be chest pain free.  Plan is to discharge home with outpatient Lexiscan.  He understands to return to the ER for evaluation if chest pain recurs.     Recommended ongoing tobacco cessation.  Also recommended he follow-up with his PMD regarding his blood sugars as I am concerned he may have diabetes.       COURTNEY Zee    Code Status   Full Code       Primary Care Physician   Jose Yip    Discharge Disposition   Discharged to home  Condition at discharge: Stable    Consultations This Hospital Stay   None    Time Spent on this Encounter   I, COURTNEY Zee, personally saw the patient today and spent greater than 30 minutes discharging this patient.    Discharge Orders      Reason for your hospital stay    You were brought in for chest  pain.  Your cardiac enzymes were normal.  Unfortunately, because you had taken your beta blocker, we were unable to get a reliable stress test.  An Echocardiogram did not show any new significant changes.  You will be going home with outpatient stress test (off your beta blocker).     When to contact your care team    Call your primary doctor if you have any of the following: temperature greater than 101, worsening shortness of breath, increased swelling, worsening pain, new or unrelenting diarrhea, dizziness/passing out, falls, bleeding that doesn't stop, uncontrolled pain, loss of taste, loss of smell, or any other new or concerning symptoms.  Call 911 or go to the Emergency Room if you need immediate assistance.     NM Lexiscan stress test (nuc card)       Discharge Medications   Discharge Medication List as of 2/3/2021  4:40 PM      CONTINUE these medications which have NOT CHANGED    Details   aspirin 81 MG chewable tablet Take 1 tablet (81 mg) by mouth daily, Disp-36 tablet, R-0, E-Prescribe      metoprolol succinate ER (TOPROL-XL) 25 MG 24 hr tablet Take 1 tablet (25 mg) by mouth daily, Disp-90 tablet,R-3, E-Prescribe      rosuvastatin (CRESTOR) 40 MG tablet Take 1 tablet (40 mg) by mouth daily, Disp-90 tablet,R-3, E-Prescribe      nitroGLYcerin (NITROSTAT) 0.4 MG sublingual tablet Place 1 tablet (0.4 mg) under the tongue every 5 minutes as needed for chest pain., Disp-25 tablet,R-3, E-Prescribe             Allergies   No Known Allergies      Data   Most Recent 3 CBC's:  Recent Labs   Lab Test 02/03/21  0552 06/19/15  0901 03/12/15  0545   WBC 11.4* 10.4 7.4   HGB 17.2 16.1 10.7*   MCV 94 89 90    246 233      Most Recent 3 BMP's:  Recent Labs   Lab Test 02/03/21  0552 09/01/20  0756 04/18/19  1030    135 136   POTASSIUM 3.8 3.8 4.1   CHLORIDE 103 104 103   CO2 28 24 25   BUN 12 11 6*   CR 0.89 0.91 1.01   ANIONGAP 5 7 12.1   VOLODYMYR 8.4* 8.4* 9.9   * 180* 139*     Most Recent 2 LFT's:  Recent  Labs   Lab Test 19  1030 18  0752   ALT 6 13     Most Recent INR's and Anticoagulation Dosing History:  Anticoagulation Dose History     Recent Dosing and Labs Latest Ref Rng & Units 3/6/2015    INR 0.86 - 1.14 0.95        Most Recent 3 Troponin's:  Recent Labs   Lab Test 21  0910 21  0552 06/19/15  0901 03/06/15  1122 03/06/15  1122   TROPI <0.015 <0.015 <0.015  The 99th percentile for upper reference range is 0.045 ug/L.  Troponin values in   the range of 0.045 - 0.120 ug/L may be associated with risks of adverse   clinical events.     < >  --    TROPONIN  --   --   --   --  0.03    < > = values in this interval not displayed.     Most Recent Cholesterol Panel:  Recent Labs   Lab Test 20  0756   CHOL 109   LDL 28   HDL 31*   TRIG 250*       Results for orders placed or performed during the hospital encounter of 21   XR Chest Port 1 View    Narrative    EXAM: XR CHEST PORT 1 VW  LOCATION: [facility]  DATE/TIME: 2/3/2021 6:21 AM    INDICATION: Chest pain.  COMPARISON: None.      Impression    IMPRESSION: Negative chest.   Echo Stress Echocardiogram    Narrative    [id number]  [facility]  [id number]  [id number]^JEANETTE^ETHAN^[facility]  Echocardiography Laboratory  [address] [place] [address]        Name: ROB CARRILLO  MRN: [id number]  : 1965  Study Date: 2021 11:10 AM  Age: 55 yrs  Gender: Male  Patient Location: [facility]  Reason For Study: Chest Pain  History: Smoker,High cholesterol,Stent,Hypertension,MI  Ordering Physician: ETHAN REID  Referring Physician: Phi  Performed By: Adele Mueller RDCS     BSA: 2.2 m2  Height: 73 in  Weight: 202 lb  HR: 67  BP: 134/82 mmHg  Medications: metoprolol,ASA,ROSUVASTATIN  _____________________________________________________________________________  __        Procedure  Stress Echo Complete. Contrast  Optison.  _____________________________________________________________________________  __        Interpretation Summary  This was non-diagnostic stress echocardiogram due to the inability to achieve  target heart rate.  This was non-diagnostic stress EKG due to the inability to achieve target  heart rate.  _____________________________________________________________________________  __     Stress  Exercise was stopped due to fatigue.  The patient did not exhibit any symptoms during exercise.  There was a normal BP response to exercise.  Target Heart Rate was not achieved due to patient taking beta blocker.  There were no ST segment changes observed with stress.  This was non-diagnostic stress EKG due to the inability to achieve target  heart rate.  Left ventricular cavity size decreases with exercise.  Global LV systolic function augments with exercise.  The visual ejection fraction is estimated at >70%.  Normal left ventricular function and wall motion at rest and post-stress.  This was non-diagnostic stress echocardiogram due to the inability to achieve  target heart rate.     Baseline  Resting ECG is normal.  Normal left ventricular function and wall motion at rest.  The visual ejection fraction is estimated at 55-60%.     Stress Results             Protocol:  [place]          Maximum Predicted HR:   163 bpm             Target HR: 139 bpm        % Maximum Predicted HR: 69 %               Stage  DurationHeart Rate  BP             Comment                    (mm:ss)   (bpm)           Stage 1   3:00      93    138/82           Stage 2   3:00      104   142/80           Stage 3   0:32      113      /          RecoveryR  6:00      78    124/68RPP 94031, FAC Below, DUKE 6               Stress Duration:   6:32 mm:ss *        Recovery Time: 6:00 mm:ss         Maximum Stress HR: 113 bpm *           METS:          7     _____________________________________________________________________________  __                              Report approved by: Thomas Campbell 2021 12:39 PM                    _____________________________________________________________________________  __      Echocardiogram Complete    Narrative    [id number]  [id number]  [id number]^JEANETTE^ETHAN^KAUSHIK           [facility]  Echocardiography Laboratory  [address] [place] [address]        Name: ROB CARRILLO  MRN: [id number]  : 1965  Study Date: 2021 03:01 PM  Age: 55 yrs  Gender: Male  Patient Location: [facility]  Reason For Study: Chest Pain  Ordering Physician: ETHAN REID  Referring Physician: zuleika Yip  Performed By: Adele Mueller RDCS     BSA: 2.2 m2  Height: 73 in  Weight: 202 lb  HR: 65  BP: 132/90 mmHg  _____________________________________________________________________________  __        Procedure  Complete Portable Echo Adult. Optison (NDC #9751-9103) given intravenously.  _____________________________________________________________________________  __        Interpretation Summary     Left ventricular systolic function is normal.  Normal left ventricular wall motion  Small portion of the distal inf/lat wall is not well seen so I cannot comment  on wall motion in this zone  Borderline aortic root dilatation.  Trivial posterior pericardial effusion  _____________________________________________________________________________  __        Left Ventricle  The left ventricle is normal in size. There is normal left ventricular wall  thickness. Left ventricular systolic function is normal. The visual ejection  fraction is estimated at 55-60%. Left ventricular diastolic function is  indeterminate. Normal left ventricular wall motion. Small portion of the  distal inf/lat wall is not well seen so I cannot comment on wall motion in  this zone.     Right Ventricle  The right ventricle is normal in size and function.     Atria  Normal left atrial size. Right atrial size is normal.     Mitral  Valve  The mitral valve leaflets appear normal. There is no evidence of stenosis,  fluttering, or prolapse.        Tricuspid Valve  Normal tricuspid valve. Right ventricular systolic pressure could not be  approximated due to inadequate tricuspid regurgitation.     Aortic Valve  Normal tricuspid aortic valve.     Pulmonic Valve  The pulmonic valve is not well seen, but is grossly normal.     Vessels  Borderline aortic root dilatation. IVC diameter <2.1 cm collapsing >50% with  sniff suggests a normal RA pressure of 3 mmHg.     Pericardium  Trivial posterior pericardial effusion.        Rhythm  Sinus rhythm was noted.  _____________________________________________________________________________  __  MMode/2D Measurements & Calculations  IVSd: 1.1 cm     LVIDd: 4.6 cm  LVIDs: 2.9 cm  LVPWd: 1.0 cm  FS: 35.7 %  LV mass(C)d: 173.3 grams  LV mass(C)dI: 80.2 grams/m2  Ao root diam: 3.9 cm  LA dimension: 3.1 cm  asc Aorta Diam: 3.2 cm  LA/Ao: 0.82  LA Volume (BP): 32.0 ml  LA Volume Index (BP): 14.8 ml/m2  RWT: 0.44  TAPSE: 1.7 cm           Doppler Measurements & Calculations  MV E max sixto: 53.6 cm/sec  MV A max sixto: 69.4 cm/sec  MV E/A: 0.77  MV max P.9 mmHg  MV mean P.83 mmHg  MV V2 VTI: 20.5 cm  MV dec time: 0.24 sec  PA acc time: 0.12 sec  E/E' av.6  Lateral E/e': 8.2  Medial E/e': 6.9           _____________________________________________________________________________  __           Report approved by: Thomas Byers 2021 04:08 PM

## 2021-02-03 NOTE — H&P
"[facility]  History and Physical  Hospitalist       Date of Admission:  2/3/2021    Assessment & Plan   Ander Doyle is a 55 year old male with ASCVD s/p interior STEMI 2015 requiring stenting of the LAD, D1, RCA, LCx, and OM1; HTN; tobacco use; and dyslipidemia who [facility] with sudden onset substernal chest pain waking him from sleep with associated diaphoresis.  BMP within normal limits save for glucose 249.  CBC notable for WBC 11.4.  Trop <0.015 x2.  EKG with NSR.  CXR negative.  Chest pain resolved after he got to the ER, but before he received nitro.  Admitted for a chest pain rule out.      Chest pain rule out  Chest pain concerning, particularly in lieu of his cardiac history.  Chest pain free.  Exercise Stress Echo 4/2019 felt to be \"probably normal\" with sensitivity reduced due to inability to achieve targeted heart rate.    - Proceed to Exercise Stress Echo  - Tele     ASCVD s/p inferior STEMI 2015   Has been doing quite well since event in 2015.  Follows with Dr Palmer  - Dieter BB for stress test     Tobacco use  Recommended smoking cessation.  Patient voices plan to quit.    COVID status:  negative  DVT Prophylaxis: Ambulate  Code Status: Full Code     Disposition: Expected discharge later today if stress test negative.       COURTNEY Zee PAC  Hospitalist Service  [facility]  Text Page (7AM - 5PM, M-F)      PRIMARY CARE PROVIDER: Jose Yip    CHIEF COMPLAINT  Chest pain    History is obtained from the patient    HISTORY OF PRESENT ILLNESS  Ander Doyle is a 55 year old male with ASCVD s/p interior STEMI 2015 requiring stenting of the LAD, D1, RCA, LCx, and OM1; HTN; and dyslipidemia who [facility]/2021 with sudden onset substernal chest pain waking him from sleep with associated diaphoresis.  Patient was in his otherwise usual state of health yesterday.  He awoke from sleep around 3AM with chest " "pain and associated diaphoresis.  Pain did not radiate to the neck or down the arm.  No nausea.  The pain interestingly moved around the chest.  Pain reminded him of the pain he had with his STEMI in 2015 so he came in for further evaluation.     In the ED, vital stable.  BMP within normal limits save for glucose 249.  CBC notable for WBC 11.4.  Trop <0.015 x2.  EKG with NSR.  CXR negative.  Chest pain resolved after he got to the ER, but before he could receive nitro.  Admitted for a chest pain rule out.     Patient seen on the floor where he is resting comfortably.  He remains chest pain free.  He questions if he \"overreacted\".  He denies shortness of breath, abdominal complaints, decreased exercise tolerance, new LE edema, or any other new or unusual changes.  He, interestingly, has no family history of cardiac disease.       PAST MEDICAL HISTORY  I have reviewed this patient's medical history and updated it with pertinent information if needed.   Past Medical History:   Diagnosis Date     Angina pectoris (H)      Benign essential hypertension      CAD (coronary artery disease) 3/2015    cardiac cath 3/10/15: SHARONDA to LAD, SHARONDA to 1st diagonal, cardiac cath 3/7/15: BMS x2 to RCA, cardiac cath 3/6/15: SHARONDA to RCA, SHARONDA to circumflex, SHARONDA to OM     Hyperlipidemia LDL goal <70      STEMI (ST elevation myocardial infarction) (H)     inferior March 2015       PAST SURGICAL HISTORY  I have reviewed this patient's surgical history and updated it with pertinent information if needed.  Past Surgical History:   Procedure Laterality Date     HEART CATH STENT COR W/WO PTCA  3/6/2015    aspiration thrombectomy and PCI with BMS in prox and mid RCA, 80% LAD, CFX stents patent (prox CFX and OM branch)     HEART CATH STENT COR W/WO PTCA  3/7/2015    SHARONDA 1st diagonal and SHARONDA mid LAD       PRIOR TO ADMISSION MEDICATIONS  Prior to Admission Medications   Prescriptions Last Dose Informant Patient Reported? Taking?   aspirin 81 MG chewable " tablet 2/3/2021 at Unknown time  No Yes   Sig: Take 1 tablet (81 mg) by mouth daily   metoprolol succinate ER (TOPROL-XL) 25 MG 24 hr tablet 2/3/2021 at Unknown time  No Yes   Sig: Take 1 tablet (25 mg) by mouth daily   nitroGLYcerin (NITROSTAT) 0.4 MG sublingual tablet Unknown at Unknown time  No No   Sig: Place 1 tablet (0.4 mg) under the tongue every 5 minutes as needed for chest pain.   rosuvastatin (CRESTOR) 40 MG tablet 2/3/2021 at Unknown time  No Yes   Sig: Take 1 tablet (40 mg) by mouth daily      Facility-Administered Medications: None       ALLERGIES  No Known Allergies    SOCIAL HISTORY  Lives at home alone.  Resumed smoking in the last few years but plans to stop.  Rare alcohol use.     FAMILY HISTORY  I have reviewed this patient's family history and updated it with pertinent information if needed.   Family History   Problem Relation Age of Onset     Arrhythmia Father        REVIEW OF SYSTEMS:  14 point comprehensive ROS undertaken with pertinent positives and negatives as above and otherwise unremarkable.     PHYSICAL EXAM  Temp: 98  F (36.7  C) Temp src: Oral BP: 136/86 Pulse: 69   Resp: 16 SpO2: 96 % O2 Device: None (Room air)    Vital Signs with Ranges  Temp:  [95.9  F (35.5  C)-98.7  F (37.1  C)] 98  F (36.7  C)  Pulse:  [60-75] 69  Resp:  [11-18] 16  BP: (132-166)/() 136/86  SpO2:  [94 %-97 %] 96 %  202 lbs 14.4 oz    GENERAL:  Pleasant, cooperative, alert. Well developed, well nourished.   HEENT: Normocephalic, atraumatic.  Extra occular mm intact.  Sclera clear. PERRL.  Mucous membranes moist.     PULMONARY: Clear to auscultation bilaterally   CARDIAC: Regular rate and rhythm.  No appreciated murmur.     ABDOMEN: Soft, nontender non distended.    MUSCULOSKELETAL:  Moving x 4 spontaneously with CMS intact x4.  Normal bulk and tone.     NEURO: Alert and oriented x3.  CN II-XII grossly intact and symmetric.  No ataxia or tremor.   SKIN:  Warm, pink, dry.    DATA  Data reviewed today:  I  personally reviewed the EKG tracing showing NSR.    Recent Labs   Lab 02/03/21  0910 02/03/21  0552   WBC  --  11.4*   HGB  --  17.2   MCV  --  94   PLT  --  218   NA  --  136   POTASSIUM  --  3.8   CHLORIDE  --  103   CO2  --  28   BUN  --  12   CR  --  0.89   ANIONGAP  --  5   VOLODYMYR  --  8.4*   GLC  --  249*   TROPI <0.015 <0.015

## 2021-02-03 NOTE — PLAN OF CARE
PRIMARY DIAGNOSIS: CHEST PAIN  OUTPATIENT/OBSERVATION GOALS TO BE MET BEFORE DISCHARGE:  1. Ruled out acute coronary syndrome (negative or stable Troponin):  Yes, follow up trops pending  2. Pain Status: Pain free.  3. Appropriate provocative testing performed: No  - Stress Test Procedure: Echo  - Interpretation of cardiac rhythm per telemetry tech: SR    4. Cleared by Consultants (if applicable):No  5. Return to near baseline physical activity: Yes  Discharge Planner Nurse   Safe discharge environment identified: Yes  Barriers to discharge: Yes, pending stress test and trops       Entered by: Lise Jenkins 02/03/2021 10:04 AM   Patient Aox4. Denies pain. Independent in room. Plan to have repeat trops and stress test then possible discharge. Will continue to monitor.  Please review provider order for any additional goals.   Nurse to notify provider when observation goals have been met and patient is ready for discharge.

## 2021-02-03 NOTE — PLAN OF CARE
"PRIMARY DIAGNOSIS: CHEST PAIN  OUTPATIENT/OBSERVATION GOALS TO BE MET BEFORE DISCHARGE:  1. Ruled out acute coronary syndrome (negative or stable Troponin):  Yes  2. Pain Status: Pain free.  3. Appropriate provocative testing performed: No  - Stress Test Procedure: Echo, stress echo  - Interpretation of cardiac rhythm per telemetry tech: SR    4. Cleared by Consultants (if applicable):No  5. Return to near baseline physical activity: Yes  Discharge Planner Nurse   Safe discharge environment identified: Yes  Barriers to discharge: Yes, pending echo follow up post stress test.       Entered by: Lise Jenkins 02/03/2021 2:15 PM    BP (!) 132/90 (BP Location: Right arm)   Pulse 64   Temp 95.9  F (35.5  C)   Resp 13   Ht 1.854 m (6' 1\")   Wt 92 kg (202 lb 14.4 oz)   SpO2 95%   BMI 26.77 kg/m    Patient Aox4. Denies pain. Independent in room. Plan to have echo then possible discharge. Will continue to monitor.  Please review provider order for any additional goals.   Nurse to notify provider when observation goals have been met and patient is ready for discharge.    "

## 2021-03-23 ENCOUNTER — HOSPITAL ENCOUNTER (OUTPATIENT)
Dept: CARDIOLOGY | Facility: CLINIC | Age: 56
End: 2021-03-23
Attending: PHYSICIAN ASSISTANT
Payer: COMMERCIAL

## 2021-03-23 ENCOUNTER — HOSPITAL ENCOUNTER (OUTPATIENT)
Dept: NUCLEAR MEDICINE | Facility: CLINIC | Age: 56
Setting detail: NUCLEAR MEDICINE
End: 2021-03-23
Attending: PHYSICIAN ASSISTANT
Payer: COMMERCIAL

## 2021-03-23 VITALS — SYSTOLIC BLOOD PRESSURE: 135 MMHG | DIASTOLIC BLOOD PRESSURE: 80 MMHG | HEART RATE: 67 BPM

## 2021-03-23 DIAGNOSIS — E78.2 MIXED HYPERLIPIDEMIA: Chronic | ICD-10-CM

## 2021-03-23 DIAGNOSIS — R07.9 CHEST PAIN, UNSPECIFIED TYPE: ICD-10-CM

## 2021-03-23 DIAGNOSIS — Z95.5 HISTORY OF CORONARY ARTERY STENT PLACEMENT: ICD-10-CM

## 2021-03-23 DIAGNOSIS — I25.10 CORONARY ARTERY DISEASE INVOLVING NATIVE CORONARY ARTERY OF NATIVE HEART WITHOUT ANGINA PECTORIS: Chronic | ICD-10-CM

## 2021-03-23 LAB
NUC STRESS EJECTION FRACTION: 60 %
STRESS ECHO BASELINE DIASTOLIC HE: 80
STRESS ECHO BASELINE HR: 70
STRESS ECHO BASELINE SYSTOLIC BP: 135
STRESS ECHO TARGET HR: 163

## 2021-03-23 PROCEDURE — 78452 HT MUSCLE IMAGE SPECT MULT: CPT | Mod: 26 | Performed by: INTERNAL MEDICINE

## 2021-03-23 PROCEDURE — 93016 CV STRESS TEST SUPVJ ONLY: CPT | Performed by: INTERNAL MEDICINE

## 2021-03-23 PROCEDURE — 93017 CV STRESS TEST TRACING ONLY: CPT

## 2021-03-23 PROCEDURE — 78452 HT MUSCLE IMAGE SPECT MULT: CPT

## 2021-03-23 PROCEDURE — 93018 CV STRESS TEST I&R ONLY: CPT | Performed by: INTERNAL MEDICINE

## 2021-03-23 PROCEDURE — 343N000001 HC RX 343: Performed by: PHYSICIAN ASSISTANT

## 2021-03-23 PROCEDURE — A9502 TC99M TETROFOSMIN: HCPCS | Performed by: PHYSICIAN ASSISTANT

## 2021-03-23 PROCEDURE — 250N000011 HC RX IP 250 OP 636

## 2021-03-23 RX ORDER — AMINOPHYLLINE 25 MG/ML
50-100 INJECTION, SOLUTION INTRAVENOUS
Status: DISCONTINUED | OUTPATIENT
Start: 2021-03-23 | End: 2021-03-24 | Stop reason: HOSPADM

## 2021-03-23 RX ORDER — REGADENOSON 0.08 MG/ML
0.4 INJECTION, SOLUTION INTRAVENOUS ONCE
Status: COMPLETED | OUTPATIENT
Start: 2021-03-23 | End: 2021-03-23

## 2021-03-23 RX ORDER — ACYCLOVIR 200 MG/1
0-1 CAPSULE ORAL
Status: DISCONTINUED | OUTPATIENT
Start: 2021-03-23 | End: 2021-03-24 | Stop reason: HOSPADM

## 2021-03-23 RX ORDER — REGADENOSON 0.08 MG/ML
INJECTION, SOLUTION INTRAVENOUS
Status: COMPLETED
Start: 2021-03-23 | End: 2021-03-23

## 2021-03-23 RX ORDER — CAFFEINE CITRATE 20 MG/ML
60 SOLUTION INTRAVENOUS
Status: DISCONTINUED | OUTPATIENT
Start: 2021-03-23 | End: 2021-03-24 | Stop reason: HOSPADM

## 2021-03-23 RX ORDER — AMINOPHYLLINE 25 MG/ML
INJECTION, SOLUTION INTRAVENOUS
Status: DISCONTINUED
Start: 2021-03-23 | End: 2021-03-23 | Stop reason: WASHOUT

## 2021-03-23 RX ORDER — ALBUTEROL SULFATE 90 UG/1
2 AEROSOL, METERED RESPIRATORY (INHALATION) EVERY 5 MIN PRN
Status: DISCONTINUED | OUTPATIENT
Start: 2021-03-23 | End: 2021-03-24 | Stop reason: HOSPADM

## 2021-03-23 RX ADMIN — TETROFOSMIN 10.5 MCI.: 1.38 INJECTION, POWDER, LYOPHILIZED, FOR SOLUTION INTRAVENOUS at 08:09

## 2021-03-23 RX ADMIN — TETROFOSMIN 32 MCI.: 1.38 INJECTION, POWDER, LYOPHILIZED, FOR SOLUTION INTRAVENOUS at 09:28

## 2021-03-23 RX ADMIN — REGADENOSON 0.4 MG: 0.08 INJECTION, SOLUTION INTRAVENOUS at 09:27

## 2021-03-24 ENCOUNTER — TELEPHONE (OUTPATIENT)
Dept: CARDIOLOGY | Facility: CLINIC | Age: 56
End: 2021-03-24

## 2021-03-24 DIAGNOSIS — I10 BENIGN ESSENTIAL HYPERTENSION: ICD-10-CM

## 2021-03-24 DIAGNOSIS — E78.6 HDL DEFICIENCY: ICD-10-CM

## 2021-03-24 DIAGNOSIS — I25.10 CORONARY ARTERY DISEASE INVOLVING NATIVE CORONARY ARTERY OF NATIVE HEART WITHOUT ANGINA PECTORIS: Primary | Chronic | ICD-10-CM

## 2021-03-24 NOTE — TELEPHONE ENCOUNTER
----- Message from Laron Metzger NP sent at 3/24/2021  4:13 PM CDT -----  Hello,     I've never met this gentleman before but it looks like he was recently admitted to [facility] with chest pain and he was ruled out for ACS with an outpatient stress test recommended in follow up. He has followed with Dr. Palmer since 2015, at which time the patient presented with an MI and underwent PCI. The stress test from yesterday notes infarction but no ischemia and no changes noted compared to the previous study in 2015 after his PCI so I suspect this is overall a stable finding and that we don't need to pursue further testing. Could you please have him follow up with Dr. Palmer or one of his APPs to discuss this and follow up on his symptoms?    Thank you!    Dave

## 2021-03-25 NOTE — TELEPHONE ENCOUNTER
Patient called with Dr. Palmer's review of stress test.  Patient will call scheduling for F/Up visit.  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 Patient will get paperwork to clinic for DOT annual.

## 2021-03-25 NOTE — TELEPHONE ENCOUNTER
Nuclear stress test 3-23-21 -      The nuclear stress test is probably abnormal.     There is a small area of nontransmural infarction in the basal inferior and inferoseptal segment(s) of the left ventricle.     Left ventricular function is normal.     The left ventricular ejection fraction at stress is 60%.     A prior study was conducted on 5/7/2015.  This study has no change when compared with the prior study    Admitted 2-3-21 overnight - PA hospitalist ordered Nuclear stress test .   Admitted for   1. Chest pain  2. ASCVD s/p STEMI 2015 requiring multiple stents  Hyperglycemia concerning for diabetes mellitus    Last Dr. Palmer visit 9-3-2020.    . Dr. Palmer messaged.

## 2021-03-25 NOTE — TELEPHONE ENCOUNTER
We know Ander has an occluded right coronary artery fills by collaterals.  Stress test is consistent with that.  I do not think we need to do anything different at this time.  We will see how he is feeling at his office visit

## 2021-03-31 ENCOUNTER — OFFICE VISIT (OUTPATIENT)
Dept: CARDIOLOGY | Facility: CLINIC | Age: 56
End: 2021-03-31
Payer: COMMERCIAL

## 2021-03-31 VITALS
HEART RATE: 76 BPM | BODY MASS INDEX: 27 KG/M2 | DIASTOLIC BLOOD PRESSURE: 72 MMHG | HEIGHT: 73 IN | WEIGHT: 203.7 LBS | SYSTOLIC BLOOD PRESSURE: 114 MMHG

## 2021-03-31 DIAGNOSIS — E78.2 MIXED HYPERLIPIDEMIA: Chronic | ICD-10-CM

## 2021-03-31 DIAGNOSIS — I25.10 CORONARY ARTERY DISEASE INVOLVING NATIVE CORONARY ARTERY OF NATIVE HEART WITHOUT ANGINA PECTORIS: Chronic | ICD-10-CM

## 2021-03-31 DIAGNOSIS — I10 BENIGN ESSENTIAL HYPERTENSION: ICD-10-CM

## 2021-03-31 DIAGNOSIS — Z72.0 TOBACCO ABUSE: ICD-10-CM

## 2021-03-31 DIAGNOSIS — I25.10 CORONARY ARTERY DISEASE INVOLVING NATIVE CORONARY ARTERY OF NATIVE HEART WITHOUT ANGINA PECTORIS: Primary | Chronic | ICD-10-CM

## 2021-03-31 LAB
ANION GAP SERPL CALCULATED.3IONS-SCNC: 9 MMOL/L (ref 3–14)
BUN SERPL-MCNC: 9 MG/DL (ref 7–30)
CALCIUM SERPL-MCNC: 8.8 MG/DL (ref 8.5–10.1)
CHLORIDE SERPL-SCNC: 105 MMOL/L (ref 94–109)
CHOLEST SERPL-MCNC: 113 MG/DL
CO2 SERPL-SCNC: 23 MMOL/L (ref 20–32)
CREAT SERPL-MCNC: 0.78 MG/DL (ref 0.66–1.25)
GFR SERPL CREATININE-BSD FRML MDRD: >90 ML/MIN/{1.73_M2}
GLUCOSE SERPL-MCNC: 214 MG/DL (ref 70–99)
HDLC SERPL-MCNC: 32 MG/DL
LDLC SERPL CALC-MCNC: 33 MG/DL
NONHDLC SERPL-MCNC: 81 MG/DL
POTASSIUM SERPL-SCNC: 3.7 MMOL/L (ref 3.4–5.3)
SODIUM SERPL-SCNC: 137 MMOL/L (ref 133–144)
TRIGL SERPL-MCNC: 241 MG/DL

## 2021-03-31 PROCEDURE — 36415 COLL VENOUS BLD VENIPUNCTURE: CPT | Performed by: INTERNAL MEDICINE

## 2021-03-31 PROCEDURE — 80061 LIPID PANEL: CPT | Performed by: INTERNAL MEDICINE

## 2021-03-31 PROCEDURE — 99214 OFFICE O/P EST MOD 30 MIN: CPT | Performed by: NURSE PRACTITIONER

## 2021-03-31 PROCEDURE — 80048 BASIC METABOLIC PNL TOTAL CA: CPT | Performed by: INTERNAL MEDICINE

## 2021-03-31 RX ORDER — NITROGLYCERIN 0.4 MG/1
TABLET SUBLINGUAL
Qty: 25 TABLET | Refills: 0 | Status: SHIPPED | OUTPATIENT
Start: 2021-03-31 | End: 2024-03-06

## 2021-03-31 ASSESSMENT — MIFFLIN-ST. JEOR: SCORE: 1802.86

## 2021-03-31 NOTE — LETTER
3/31/2021    Jose Yip MD  [facility] [address]  [facility] [address]    RE: Ander Doyle       Dear Colleague,    I had the pleasure of seeing Ander Doyle in the [facility].    Cardiology Clinic Progress Note  Ander Doyle MRN# [id number]   YOB: 1965 Age: 55 year old     Reason For Visit:  Review of stress test results   Primary Cardiologist:   Dr. Palmer          History of Presenting Illness:      Ander Doyle is a pleasant 55 year old patient who carries a past medical history significant for coronary artery disease status post inferior STEMI in 2015 status post multiple PCI to the LAD, D1, RCA, circumflex, and OM1, hypertension, tobacco abuse, and dyslipidemia.    On 2/3/2021, he presented to [facility] with sudden onset of chest pain.  His cardiac work-up showed no acute ischemic changes on EKG, negative troponin, negative chest x-ray, and normal CBC and BMP with the exception of an elevated glucose of 249.  He underwent a stress echocardiogram but was unable to achieve target heart rate.  Subsequently underwent a nuclear stress test that showed a small area of nontransmural infarction in the basal inferior and inferoseptal segment of the LV, LV function was normal at 60%.  In comparison to his previous study in 2015, there was no significant changes.  Results were reviewed by Dr. Palmer who felt the stress test was consistent with a known occluded right coronary artery filled by collaterals.  No invasive procedure was recommended.  He returns to the office today for review of results.     He is feeling well on a cardiac standpoint, denies chest pain, shortness of breath, PND, orthopnea, presyncope, syncope, edema, heart racing, or palpitations.  He reports no further recurrent chest pain episodes since his hospitalization in February.    Blood pressure is well  controlled at 114/72, managed on metoprolol  Labs today showed a sodium of 137, potassium 3.7, BUN 9, creatinine 0.78, GFR greater than 60.  Fasting glucose was noted to be elevated at 214.  Of note, A1c in 2018 was 8.4, on no diabetic management.  CBC showed a normal hemoglobin of 17.2, platelet 218, WBC mildly elevated at 11.4  Lipid panel showed a total cholesterol of 113, HDL 32, LDL 33, triglycerides 241, on high-dose Crestor  BMI 26.87    He works as a . He does not engage in any routine exercise.  He does not follow a strict diet but feels he eats healthy.   He continues to smoke socially  Remains compliant with all medications.                   Assessment and Plan:       1.  Coronary artery disease - status post multiple PCI to the LAD, D1, RCA, circumflex, and OM1.  Recent nuclear stress test that showed a small area of nontransmural infarction in the basal inferior and inferoseptal segment of the LV, LV function was normal at 60%.  In comparison to his previous study in 2015, there was no significant changes.  Results were reviewed by Dr. Palmer who felt the stress test was consistent with a known occluded right coronary artery filled by collaterals.  No invasive procedure was recommended. No recurrent ischemic symptoms.  Continue on aspirin, metoprolol, and statin.  Encourage routine exercise and weight loss.     2.  Hypertension -well controlled  3.  Hyperlipidemia -lipid panel today showed a total cholesterol of 113, HDL 32, LDL 33, triglycerides 241.  To new high-dose rosuvastatin.  Encourage strict heart healthy diet.  4.  Tobacco abuse -occasional, counseled on strict walking cessation.  5 Elevated fasting glucose - recommend follow up with PCP for evaluation and management.          Thank you for allowing me to participate in this delightful patient's care. I have recommended he follow up in 1 year with Dr. Palmer.       Betty Luciano, RICHIE CNP         Review of Systems:      Review of Systems:  Skin:  Negative     Eyes:  Negative    ENT:  Negative    Respiratory:  Negative    Cardiovascular:    Positive for;chest pain  Gastroenterology: Negative    Genitourinary:  Negative    Musculoskeletal:  Negative    Neurologic:  Positive for numbness or tingling of feet  Psychiatric:  Positive for sleep disturbances  Heme/Lymph/Imm:  Positive for night sweats  Endocrine:  Negative                Physical Exam:     GEN:  In general, this is a well nourished male in no acute distress.  HEENT:  Pupils equal, round. Sclerae nonicteric. Clear oropharynx. Mucous membranes moist.  NECK: Supple, no masses appreciated. Trachea midline.  No JVD   C/V:  Regular rate and rhythm, no murmur, rub or gallop. No S3 or RV heave.   RESP: Respirations are unlabored. No use of accessory muscles. Clear to auscultation bilaterally without wheezing, rales, or rhonchi.  GI: Abdomen soft, nontender, nondistended. No HSM appreciated.   EXTREM: No LE edema. No cyanosis or clubbing.  NEURO: Alert and oriented, cooperative. No obvious focal deficits.   PSYCH: Normal affect.  SKIN: Warm and dry. No rashes or petechiae appreciated.          Past Medical History:     Past Medical History:   Diagnosis Date     Angina pectoris (H)      Benign essential hypertension      CAD (coronary artery disease) 3/2015    cardiac cath 3/10/15: SHARONDA to LAD, SHARONDA to 1st diagonal, cardiac cath 3/7/15: BMS x2 to RCA, cardiac cath 3/6/15: SHARONDA to RCA, SHARONDA to circumflex, SHARONDA to OM     Hyperlipidemia LDL goal <70      STEMI (ST elevation myocardial infarction) (H)     inferior March 2015              Past Surgical History:     Past Surgical History:   Procedure Laterality Date     HEART CATH STENT COR W/WO PTCA  3/6/2015    aspiration thrombectomy and PCI with BMS in prox and mid RCA, 80% LAD, CFX stents patent (prox CFX and OM branch)     HEART CATH STENT COR W/WO PTCA  3/7/2015    SHARONDA 1st diagonal and SHARONDA mid LAD              Allergies:   Patient  has no known allergies.       Data:   All laboratory data reviewed:    LAST CHOLESTEROL:  Status:  Final result   Visible to patient:  No (inaccessible in MyChart) Dx:  Coronary artery disease involving manish...    Ref Range & Units  8:40 AM 7mo ago    Cholesterol <200 mg/dL 113  109     Triglycerides <150 mg/dL 241High   250High  CM    Comment: Borderline high:  150-199 mg/dl   High:             200-499 mg/dl   Very high:       >499 mg/dl   Fasting specimen     HDL Cholesterol >39 mg/dL 32Low   31Low      LDL Cholesterol Calculated <100 mg/dL 33  28 CM    Comment: Desirable:       <100 mg/dl    Non HDL Cholesterol <130 mg/dL 81  78    Resulting [facility]   [facility]         Specimen Collected: 03/31/21  8:40 AM Last Resulted: 03/31/21  9:29 AM             LAST BMP:   Ref Range & Units  8:40 AM  1 month    Sodium 133 - 144 mmol/L 137  136     Potassium 3.4 - 5.3 mmol/L 3.7  3.8     Chloride 94 - 109 mmol/L 105  103     Carbon Dioxide 20 - 32 mmol/L 23  28     Anion Gap 3 - 14 mmol/L 9  5     Glucose 70 - 99 mg/dL 214High   249High     Comment: Fasting specimen    Urea Nitrogen 7 - 30 mg/dL 9  12     Creatinine 0.66 - 1.25 mg/dL 0.78  0.89     GFR Estimate >60 mL/min >90  >90 CM    Comment: Non  GFR Calc   Starting 12/18/2018, serum creatinine based estimated GFR (eGFR) will be   calculated using the Chronic Kidney Disease Epidemiology Collaboration   (CKD-EPI) equation.     GFR Estimate If Black >60 mL/min/ >90  >90 CM    Comment:  GFR Calc   Starting 12/18/2018, serum creatinine based estimated GFR (eGFR) will be   calculated using the Chronic Kidney Disease Epidemiology Collaboration   (CKD-EPI) equation.     Calcium 8.5 - 10.1 mg/dL 8.8  8.4Low     Resulting [facility]   [facility]  Lab         Specimen Collected: 03/31/21  8:40 AM Last Resulted: 03/31/21  9:27 AM             LAST CBC:  Lab Results   Component Value Date    WBC 11.4 02/03/2021     Lab Results   Component Value Date    RBC 5.43 02/03/2021     Lab Results   Component Value Date    HGB 17.2 02/03/2021     Lab Results   Component Value Date    HCT 50.8 02/03/2021     Lab Results   Component Value Date    MCV 94 02/03/2021     Lab Results   Component Value Date    MCH 31.7 02/03/2021     Lab Results   Component Value Date    MCHC 33.9 02/03/2021     Lab Results   Component Value Date    RDW 11.9 02/03/2021     Lab Results   Component Value Date     02/03/2021       Thank you for allowing me to participate in the care of your patient.      Sincerely,     RICHIE Waller [facility]    cc:   No referring provider defined for this encounter.

## 2021-03-31 NOTE — PATIENT INSTRUCTIONS
Thanks for participating in a office visit with the [facility] clinic today.    No recurrent episodes of chest pain since February.   Reviewed results of stress test and echocardiogram  Blood pressures well controlled   Continue on current medical therapy  Reordered SL nitroglycerin  Reviewed results of labs, glucose levels elevated. Recommend follow up with PCP for management.     Follow up in 1 year with Dr. Palmer     Please call my nurse at [phone] with any questions or concerns.    Scheduling phone number: [phone]  Reminder: Please bring in all current medications, over the counter supplements and vitamin bottles to your next appointment.

## 2021-03-31 NOTE — PROGRESS NOTES
Cardiology Clinic Progress Note  Ander Doyle MRN# [id number]   YOB: 1965 Age: 55 year old     Reason For Visit:  Review of stress test results   Primary Cardiologist:   Dr. Palmer          History of Presenting Illness:      Ander Doyle is a pleasant 55 year old patient who carries a past medical history significant for coronary artery disease status post inferior STEMI in 2015 status post multiple PCI to the LAD, D1, RCA, circumflex, and OM1, hypertension, tobacco abuse, and dyslipidemia.    On 2/3/2021, he presented to [facility] with sudden onset of chest pain.  His cardiac work-up showed no acute ischemic changes on EKG, negative troponin, negative chest x-ray, and normal CBC and BMP with the exception of an elevated glucose of 249.  He underwent a stress echocardiogram but was unable to achieve target heart rate.  Subsequently underwent a nuclear stress test that showed a small area of nontransmural infarction in the basal inferior and inferoseptal segment of the LV, LV function was normal at 60%.  In comparison to his previous study in 2015, there was no significant changes.  Results were reviewed by Dr. Palmer who felt the stress test was consistent with a known occluded right coronary artery filled by collaterals.  No invasive procedure was recommended.  He returns to the office today for review of results.     He is feeling well on a cardiac standpoint, denies chest pain, shortness of breath, PND, orthopnea, presyncope, syncope, edema, heart racing, or palpitations.  He reports no further recurrent chest pain episodes since his hospitalization in February.    Blood pressure is well controlled at 114/72, managed on metoprolol  Labs today showed a sodium of 137, potassium 3.7, BUN 9, creatinine 0.78, GFR greater than 60.  Fasting glucose was noted to be elevated at 214.  Of note, A1c in 2018 was 8.4, on no diabetic management.  CBC showed a normal hemoglobin of  17.2, platelet 218, WBC mildly elevated at 11.4  Lipid panel showed a total cholesterol of 113, HDL 32, LDL 33, triglycerides 241, on high-dose Crestor  BMI 26.87    He works as a . He does not engage in any routine exercise.  He does not follow a strict diet but feels he eats healthy.   He continues to smoke socially  Remains compliant with all medications.                   Assessment and Plan:       1.  Coronary artery disease - status post multiple PCI to the LAD, D1, RCA, circumflex, and OM1.  Recent nuclear stress test that showed a small area of nontransmural infarction in the basal inferior and inferoseptal segment of the LV, LV function was normal at 60%.  In comparison to his previous study in 2015, there was no significant changes.  Results were reviewed by Dr. Palmer who felt the stress test was consistent with a known occluded right coronary artery filled by collaterals.  No invasive procedure was recommended. No recurrent ischemic symptoms.  Continue on aspirin, metoprolol, and statin.  Encourage routine exercise and weight loss.     2.  Hypertension -well controlled  3.  Hyperlipidemia -lipid panel today showed a total cholesterol of 113, HDL 32, LDL 33, triglycerides 241.  To new high-dose rosuvastatin.  Encourage strict heart healthy diet.  4.  Tobacco abuse -occasional, counseled on strict walking cessation.  5 Elevated fasting glucose - recommend follow up with PCP for evaluation and management.          Thank you for allowing me to participate in this delightful patient's care. I have recommended he follow up in 1 year with Dr. Palmer.       Betty Luciano, APRN CNP         Review of Systems:     Review of Systems:  Skin:  Negative     Eyes:  Negative    ENT:  Negative    Respiratory:  Negative    Cardiovascular:    Positive for;chest pain  Gastroenterology: Negative    Genitourinary:  Negative    Musculoskeletal:  Negative    Neurologic:  Positive for numbness or tingling of  feet  Psychiatric:  Positive for sleep disturbances  Heme/Lymph/Imm:  Positive for night sweats  Endocrine:  Negative                Physical Exam:     GEN:  In general, this is a well nourished male in no acute distress.  HEENT:  Pupils equal, round. Sclerae nonicteric. Clear oropharynx. Mucous membranes moist.  NECK: Supple, no masses appreciated. Trachea midline.  No JVD   C/V:  Regular rate and rhythm, no murmur, rub or gallop. No S3 or RV heave.   RESP: Respirations are unlabored. No use of accessory muscles. Clear to auscultation bilaterally without wheezing, rales, or rhonchi.  GI: Abdomen soft, nontender, nondistended. No HSM appreciated.   EXTREM: No LE edema. No cyanosis or clubbing.  NEURO: Alert and oriented, cooperative. No obvious focal deficits.   PSYCH: Normal affect.  SKIN: Warm and dry. No rashes or petechiae appreciated.          Past Medical History:     Past Medical History:   Diagnosis Date     Angina pectoris (H)      Benign essential hypertension      CAD (coronary artery disease) 3/2015    cardiac cath 3/10/15: SHARONDA to LAD, SHARONDA to 1st diagonal, cardiac cath 3/7/15: BMS x2 to RCA, cardiac cath 3/6/15: SHARONDA to RCA, SHARONDA to circumflex, SHARONDA to OM     Hyperlipidemia LDL goal <70      STEMI (ST elevation myocardial infarction) (H)     inferior March 2015              Past Surgical History:     Past Surgical History:   Procedure Laterality Date     HEART CATH STENT COR W/WO PTCA  3/6/2015    aspiration thrombectomy and PCI with BMS in prox and mid RCA, 80% LAD, CFX stents patent (prox CFX and OM branch)     HEART CATH STENT COR W/WO PTCA  3/7/2015    SHARONDA 1st diagonal and SHARONDA mid LAD              Allergies:   Patient has no known allergies.       Data:   All laboratory data reviewed:    LAST CHOLESTEROL:  Status:  Final result   Visible to patient:  No (inaccessible in MyChart) Dx:  Coronary artery disease involving manish...    Ref Range & Units  8:40 AM 7mo ago    Cholesterol <200 mg/dL 113  109      Triglycerides <150 mg/dL 241High   250High  CM    Comment: Borderline high:  150-199 mg/dl   High:             200-499 mg/dl   Very high:       >499 mg/dl   Fasting specimen     HDL Cholesterol >39 mg/dL 32Low   31Low      LDL Cholesterol Calculated <100 mg/dL 33  28 CM    Comment: Desirable:       <100 mg/dl    Non HDL Cholesterol <130 mg/dL 81  78    Resulting [facility]   [facility]         Specimen Collected: 03/31/21  8:40 AM Last Resulted: 03/31/21  9:29 AM             LAST BMP:   Ref Range & Units  8:40 AM  1 month    Sodium 133 - 144 mmol/L 137  136     Potassium 3.4 - 5.3 mmol/L 3.7  3.8     Chloride 94 - 109 mmol/L 105  103     Carbon Dioxide 20 - 32 mmol/L 23  28     Anion Gap 3 - 14 mmol/L 9  5     Glucose 70 - 99 mg/dL 214High   249High     Comment: Fasting specimen    Urea Nitrogen 7 - 30 mg/dL 9  12     Creatinine 0.66 - 1.25 mg/dL 0.78  0.89     GFR Estimate >60 mL/min >90  >90 CM    Comment: Non  GFR Calc   Starting 12/18/2018, serum creatinine based estimated GFR (eGFR) will be   calculated using the Chronic Kidney Disease Epidemiology Collaboration   (CKD-EPI) equation.     GFR Estimate If Black >60 mL/min/ >90  >90 CM    Comment:  GFR Calc   Starting 12/18/2018, serum creatinine based estimated GFR (eGFR) will be   calculated using the Chronic Kidney Disease Epidemiology Collaboration   (CKD-EPI) equation.     Calcium 8.5 - 10.1 mg/dL 8.8  8.4Low     Resulting [facility]   [facility]         Specimen Collected: 03/31/21  8:40 AM Last Resulted: 03/31/21  9:27 AM             LAST CBC:  Lab Results   Component Value Date    WBC 11.4 02/03/2021     Lab Results   Component Value Date    RBC 5.43 02/03/2021     Lab Results   Component Value Date    HGB 17.2 02/03/2021      Lab Results   Component Value Date    HCT 50.8 02/03/2021     Lab Results   Component Value Date    MCV 94 02/03/2021     Lab Results   Component Value Date    MCH 31.7 02/03/2021     Lab Results   Component Value Date    MCHC 33.9 02/03/2021     Lab Results   Component Value Date    RDW 11.9 02/03/2021     Lab Results   Component Value Date     02/03/2021

## 2021-04-01 ENCOUNTER — TELEPHONE (OUTPATIENT)
Dept: CARDIOLOGY | Facility: CLINIC | Age: 56
End: 2021-04-01

## 2021-04-01 NOTE — TELEPHONE ENCOUNTER
Message from CURTIS Betty Luciano:  Parker Toribio,     I seen Ander today in clinic for results of his recent stress test. 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 He did not bring with him any paperwork and said in the past we have just faxed over the required documentation. Do you know how to take care of this ?     Thanks,   Betty Coulter spoke with patient, he requests that we fax his stress test results, office note and recent labs to [phone] at [phone].  Records sent as requested.

## 2021-09-03 DIAGNOSIS — I21.11 ST ELEVATION MYOCARDIAL INFARCTION INVOLVING RIGHT CORONARY ARTERY (H): ICD-10-CM

## 2021-09-03 DIAGNOSIS — I10 BENIGN ESSENTIAL HYPERTENSION: ICD-10-CM

## 2021-09-03 RX ORDER — METOPROLOL SUCCINATE 25 MG/1
25 TABLET, EXTENDED RELEASE ORAL DAILY
Qty: 90 TABLET | Refills: 2 | Status: SHIPPED | OUTPATIENT
Start: 2021-09-03 | End: 2022-03-02

## 2021-09-03 NOTE — TELEPHONE ENCOUNTER
Received refill request for:  Metoprolol Succinate  Last OV was: 3/31/2021 with STEVEN Crowell  Labs/EKG: na  F/U scheduled: orders in Epic for 3/2022  New script sent to: [facility]

## 2021-09-08 DIAGNOSIS — I25.10 CORONARY ARTERY DISEASE INVOLVING NATIVE CORONARY ARTERY OF NATIVE HEART WITHOUT ANGINA PECTORIS: Chronic | ICD-10-CM

## 2021-09-08 DIAGNOSIS — E78.2 MIXED HYPERLIPIDEMIA: Chronic | ICD-10-CM

## 2021-09-08 RX ORDER — ROSUVASTATIN CALCIUM 40 MG/1
40 TABLET, COATED ORAL DAILY
Qty: 90 TABLET | Refills: 3 | Status: SHIPPED | OUTPATIENT
Start: 2021-09-08 | End: 2022-03-02

## 2021-09-08 NOTE — TELEPHONE ENCOUNTER
Medication Refilled: Rosuvastatin   Last office visit: 3/31/21 with Betty Luciano CNP  Last Labs/EKG: 3/31/21 FLP  Next office visit: 3/2022 orders in [facility]   Pharmacy sent to: JETT Cat RN

## 2021-12-20 NOTE — TELEPHONE ENCOUNTER
Follow-up with a PCP as needed. Return to the ED if you develop any concerning symptoms such as severe difficulty breathing or severe chest pain. Patient called requesting Nitro SL refill.  Patient states he has not required them and has not used them but keeps them just in case.  Refill e scribed.   Directions of use reviewed with patient.

## 2022-03-02 ENCOUNTER — TELEPHONE (OUTPATIENT)
Dept: CARDIOLOGY | Facility: CLINIC | Age: 57
End: 2022-03-02
Payer: COMMERCIAL

## 2022-03-02 DIAGNOSIS — E78.6 HDL DEFICIENCY: ICD-10-CM

## 2022-03-02 DIAGNOSIS — E78.2 MIXED HYPERLIPIDEMIA: Chronic | ICD-10-CM

## 2022-03-02 DIAGNOSIS — I25.10 CORONARY ARTERY DISEASE INVOLVING NATIVE CORONARY ARTERY OF NATIVE HEART WITHOUT ANGINA PECTORIS: Primary | ICD-10-CM

## 2022-03-02 DIAGNOSIS — I10 BENIGN ESSENTIAL HYPERTENSION: ICD-10-CM

## 2022-03-02 DIAGNOSIS — I21.11 ST ELEVATION MYOCARDIAL INFARCTION INVOLVING RIGHT CORONARY ARTERY (H): ICD-10-CM

## 2022-03-02 RX ORDER — ROSUVASTATIN CALCIUM 40 MG/1
40 TABLET, COATED ORAL DAILY
Qty: 90 TABLET | Refills: 0 | Status: SHIPPED | OUTPATIENT
Start: 2022-03-02 | End: 2022-06-10

## 2022-03-02 RX ORDER — METOPROLOL SUCCINATE 25 MG/1
25 TABLET, EXTENDED RELEASE ORAL DAILY
Qty: 90 TABLET | Refills: 0 | Status: SHIPPED | OUTPATIENT
Start: 2022-03-02 | End: 2022-06-03

## 2022-03-02 NOTE — TELEPHONE ENCOUNTER
Voicemail received from patient calling to make an appointment with Dr Palmer.     Pt due for annual visit 3/2022, Dr Palmer booked out to June. Spoke to patient, states he is doing well a this time, no concerns. He feels comfortable waiting until June to be seen. FLP also due at that time and order placed. Scheduling phone number provided to call for an appointment. He will call in the interim if he has any concerns.     Pt asks for refills of his prescriptions be sent to [facility] in [place]. 90-day supply sent.

## 2022-06-03 DIAGNOSIS — I21.11 ST ELEVATION MYOCARDIAL INFARCTION INVOLVING RIGHT CORONARY ARTERY (H): ICD-10-CM

## 2022-06-03 DIAGNOSIS — I10 BENIGN ESSENTIAL HYPERTENSION: ICD-10-CM

## 2022-06-03 RX ORDER — METOPROLOL SUCCINATE 25 MG/1
25 TABLET, EXTENDED RELEASE ORAL DAILY
Qty: 90 TABLET | Refills: 2 | Status: SHIPPED | OUTPATIENT
Start: 2022-06-03 | End: 2023-04-17

## 2022-06-03 NOTE — TELEPHONE ENCOUNTER
[facility] Cardiology Refill Guideline reviewed.  Medication meets criteria for refill.  Future OV scheduled for 9/27/22.  Ni Cifuentes RN on 6/3/2022 at 11:27 AM

## 2022-06-10 DIAGNOSIS — I25.10 CORONARY ARTERY DISEASE INVOLVING NATIVE CORONARY ARTERY OF NATIVE HEART WITHOUT ANGINA PECTORIS: ICD-10-CM

## 2022-06-10 DIAGNOSIS — E78.2 MIXED HYPERLIPIDEMIA: Chronic | ICD-10-CM

## 2022-06-10 RX ORDER — ROSUVASTATIN CALCIUM 40 MG/1
40 TABLET, COATED ORAL DAILY
Qty: 90 TABLET | Refills: 1 | Status: SHIPPED | OUTPATIENT
Start: 2022-06-10 | End: 2022-12-09

## 2022-08-18 ENCOUNTER — TELEPHONE (OUTPATIENT)
Dept: CARDIOLOGY | Facility: CLINIC | Age: 57
End: 2022-08-18

## 2022-08-18 NOTE — TELEPHONE ENCOUNTER
Voicemail received from patient asking if he needs to hold any medications prior to his colonoscopy next Tuesday 8/23.     Spoke to patient, says he is trying to be proactive ahead of his procedure but was not given any explicit direction regarding his meds by the GI team at [facility]. Discussed that the only med they may want him to hold would be aspirin, but that he needs to verify with them first if they even want him to stop it. He will check with them and call back if an ASA hold is requested.

## 2022-09-26 ENCOUNTER — LAB (OUTPATIENT)
Dept: LAB | Facility: CLINIC | Age: 57
End: 2022-09-26
Payer: COMMERCIAL

## 2022-09-26 DIAGNOSIS — I25.10 CORONARY ARTERY DISEASE INVOLVING NATIVE CORONARY ARTERY OF NATIVE HEART WITHOUT ANGINA PECTORIS: ICD-10-CM

## 2022-09-26 DIAGNOSIS — E78.6 HDL DEFICIENCY: ICD-10-CM

## 2022-09-26 LAB
CHOLEST SERPL-MCNC: 103 MG/DL
HDLC SERPL-MCNC: 35 MG/DL
LDLC SERPL CALC-MCNC: 37 MG/DL
NONHDLC SERPL-MCNC: 68 MG/DL
TRIGL SERPL-MCNC: 153 MG/DL

## 2022-09-26 PROCEDURE — 80061 LIPID PANEL: CPT | Performed by: INTERNAL MEDICINE

## 2022-09-26 PROCEDURE — 36415 COLL VENOUS BLD VENIPUNCTURE: CPT | Performed by: INTERNAL MEDICINE

## 2022-10-05 ENCOUNTER — TELEPHONE (OUTPATIENT)
Dept: CARDIOLOGY | Facility: CLINIC | Age: 57
End: 2022-10-05

## 2022-10-05 NOTE — TELEPHONE ENCOUNTER
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 Pt has not been seen since 3/2021.     Spoke to patient, he was orphaned to 10/11/22 but he cannot keep that appointment due to other scheduling conflicts and then he is going out of town. He would rather just wait until February. Discussed that since he has not been seen in over a year and a half, the preference would be to have him seen sooner rather than later but that ultimately it is his choice. Pt will call scheduling and see what else they have available, telephone number provided.

## 2022-11-01 ENCOUNTER — OFFICE VISIT (OUTPATIENT)
Dept: CARDIOLOGY | Facility: CLINIC | Age: 57
End: 2022-11-01
Payer: COMMERCIAL

## 2022-11-01 VITALS
DIASTOLIC BLOOD PRESSURE: 86 MMHG | HEIGHT: 73 IN | SYSTOLIC BLOOD PRESSURE: 135 MMHG | BODY MASS INDEX: 26.24 KG/M2 | HEART RATE: 67 BPM | WEIGHT: 198 LBS

## 2022-11-01 DIAGNOSIS — E88.810 METABOLIC SYNDROME X: ICD-10-CM

## 2022-11-01 DIAGNOSIS — R09.89 LEFT CAROTID BRUIT: ICD-10-CM

## 2022-11-01 DIAGNOSIS — E78.2 MIXED HYPERLIPIDEMIA: Chronic | ICD-10-CM

## 2022-11-01 DIAGNOSIS — Z72.0 TOBACCO ABUSE: ICD-10-CM

## 2022-11-01 DIAGNOSIS — E11.9 TYPE 2 DIABETES MELLITUS WITHOUT COMPLICATION, WITHOUT LONG-TERM CURRENT USE OF INSULIN (H): ICD-10-CM

## 2022-11-01 DIAGNOSIS — E78.6 HDL DEFICIENCY: ICD-10-CM

## 2022-11-01 DIAGNOSIS — I10 BENIGN ESSENTIAL HYPERTENSION: ICD-10-CM

## 2022-11-01 DIAGNOSIS — I25.10 CORONARY ARTERY DISEASE INVOLVING NATIVE CORONARY ARTERY OF NATIVE HEART WITHOUT ANGINA PECTORIS: Primary | Chronic | ICD-10-CM

## 2022-11-01 PROCEDURE — 99214 OFFICE O/P EST MOD 30 MIN: CPT | Performed by: INTERNAL MEDICINE

## 2022-11-01 NOTE — PROGRESS NOTES
HPI and Plan:   See dictation    Today's clinic visit entailed:  Review of the result(s) of each unique test - Stress nuclear scan, lab work  Ordering of each unique test  Prescription drug management  30 minutes spent on the date of the encounter doing chart review, history and exam, documentation and further activities per the note  Provider  Link to MDM Help Grid           Orders Placed This Encounter   Procedures     US Carotid Bilateral     Lipid Profile     ALT     Basic metabolic panel     Follow-Up with Cardiology       Orders Placed This Encounter   Medications     metFORMIN (GLUCOPHAGE) 500 MG tablet     Sig: Take 1 tablet by mouth 2 times daily (with meals)       There are no discontinued medications.      Encounter Diagnoses   Name Primary?     Coronary artery disease involving native coronary artery of native heart without angina pectoris Yes     Left carotid bruit      Mixed hyperlipidemia      Benign essential hypertension      HDL deficiency      Tobacco abuse      Type 2 diabetes mellitus without complication, without long-term current use of insulin (H)      Metabolic syndrome X        CURRENT MEDICATIONS:  Current Outpatient Medications   Medication Sig Dispense Refill     aspirin 81 MG chewable tablet Take 1 tablet (81 mg) by mouth daily 36 tablet 0     metFORMIN (GLUCOPHAGE) 500 MG tablet Take 1 tablet by mouth 2 times daily (with meals)       metoprolol succinate ER (TOPROL XL) 25 MG 24 hr tablet Take 1 tablet (25 mg) by mouth daily 90 tablet 2     nitroGLYcerin (NITROSTAT) 0.4 MG sublingual tablet Place 1 tablet (0.4 mg) under the tongue every 5 minutes as needed for chest pain. 25 tablet 0     rosuvastatin (CRESTOR) 40 MG tablet Take 1 tablet (40 mg) by mouth daily 90 tablet 1       ALLERGIES   No Known Allergies    PAST MEDICAL HISTORY:  Past Medical History:   Diagnosis Date     Angina pectoris (H)      Benign essential hypertension      CAD (coronary artery disease) 3/2015    cardiac cath  3/10/15: SHARONDA to LAD, SHARONDA to 1st diagonal, cardiac cath 3/7/15: BMS x2 to RCA, cardiac cath 3/6/15: SHARONDA to RCA, SHARONDA to circumflex, SHARONDA to OM     Hyperlipidemia LDL goal <70      STEMI (ST elevation myocardial infarction) (H)     inferior March 2015       PAST SURGICAL HISTORY:  Past Surgical History:   Procedure Laterality Date     HEART CATH STENT COR W/WO PTCA  3/6/2015    aspiration thrombectomy and PCI with BMS in prox and mid RCA, 80% LAD, CFX stents patent (prox CFX and OM branch)     HEART CATH STENT COR W/WO PTCA  3/7/2015    SHARONDA 1st diagonal and SHARONDA mid LAD       FAMILY HISTORY:  Family History   Problem Relation Age of Onset     Arrhythmia Father        SOCIAL HISTORY:  Social History     Socioeconomic History     Marital status: Single     Spouse name: None     Number of children: None     Years of education: None     Highest education level: None   Tobacco Use     Smoking status: Some Days     Packs/day: 0.50     Years: 30.00     Pack years: 15.00     Types: Cigarettes     Smokeless tobacco: Never     Tobacco comments:     6-8 months since last cigarette 3/31/21; quit 3/6/15 -  occas. has one  -very seldom   Substance and Sexual Activity     Alcohol use: Yes     Alcohol/week: 0.0 standard drinks     Comment: rare     Drug use: No   Other Topics Concern     Caffeine Concern No     Comment: 1 can of pop daily     Sleep Concern No     Comment: sometimes     Stress Concern No     Weight Concern No     Special Diet No     Comment: more lean meats, more greens     Exercise No       Review of Systems:  Skin:  not assessed       Eyes:  not assessed      ENT:  not assessed      Respiratory:  Negative       Cardiovascular:  Negative      Gastroenterology: not assessed      Genitourinary:  not assessed      Musculoskeletal:  not assessed      Neurologic:  not assessed      Psychiatric:  not assessed      Heme/Lymph/Imm:  not assessed      Endocrine:  Positive for diabetes Pt doesn't know diabetes type    Physical  "Exam:  Vitals: /86   Pulse 67   Ht 1.854 m (6' 1\")   Wt 89.8 kg (198 lb)   BMI 26.12 kg/m      Constitutional:  cooperative, alert and oriented, well developed, well nourished, in no acute distress        Skin:  warm and dry to the touch, no apparent skin lesions or masses noted          Head:  normocephalic, no masses or lesions        Eyes:  pupils equal and round, conjunctivae and lids unremarkable, sclera white, no xanthalasma, EOMS intact, no nystagmus        Lymph:      ENT:  no pallor or cyanosis, dentition good        Neck:    left carotid bruit      Respiratory:  normal breath sounds, clear to auscultation, normal A-P diameter, normal symmetry, normal respiratory excursion, no use of accessory muscles         Cardiac: regular rhythm                  pulses below the femoral arteries are diminished                                      GI:           Extremities and Muscular Skeletal:  no edema;no spinal abnormalities noted;normal muscle strength and tone         no edema    Neurological:  no gross motor deficits   motor grossly intact    Psych:  affect appropriate, oriented to time, person and place        CC  Claudio Palmer MD  [id number]  [place] [id number]              "

## 2022-11-01 NOTE — LETTER
11/1/2022    Jose Yip MD  [facility] [address]  [facility] [address]    RE: Ander Doyle       Dear Colleague,     I had the pleasure of seeing Ander Doyle in the [facility].  HPI and Plan:   See dictation    Today's clinic visit entailed:  Review of the result(s) of each unique test - Stress nuclear scan, lab work  Ordering of each unique test  Prescription drug management  30 minutes spent on the date of the encounter doing chart review, history and exam, documentation and further activities per the note  Provider  Link to MDM Help Grid           Orders Placed This Encounter   Procedures     US Carotid Bilateral     Lipid Profile     ALT     Basic metabolic panel     Follow-Up with Cardiology       Orders Placed This Encounter   Medications     metFORMIN (GLUCOPHAGE) 500 MG tablet     Sig: Take 1 tablet by mouth 2 times daily (with meals)       There are no discontinued medications.      Encounter Diagnoses   Name Primary?     Coronary artery disease involving native coronary artery of native heart without angina pectoris Yes     Left carotid bruit      Mixed hyperlipidemia      Benign essential hypertension      HDL deficiency      Tobacco abuse      Type 2 diabetes mellitus without complication, without long-term current use of insulin (H)      Metabolic syndrome X        CURRENT MEDICATIONS:  Current Outpatient Medications   Medication Sig Dispense Refill     aspirin 81 MG chewable tablet Take 1 tablet (81 mg) by mouth daily 36 tablet 0     metFORMIN (GLUCOPHAGE) 500 MG tablet Take 1 tablet by mouth 2 times daily (with meals)       metoprolol succinate ER (TOPROL XL) 25 MG 24 hr tablet Take 1 tablet (25 mg) by mouth daily 90 tablet 2     nitroGLYcerin (NITROSTAT) 0.4 MG sublingual tablet Place 1 tablet (0.4 mg) under the tongue every 5 minutes as needed for chest pain. 25 tablet 0     rosuvastatin (CRESTOR) 40 MG tablet Take 1 tablet (40 mg) by  mouth daily 90 tablet 1       ALLERGIES   No Known Allergies    PAST MEDICAL HISTORY:  Past Medical History:   Diagnosis Date     Angina pectoris (H)      Benign essential hypertension      CAD (coronary artery disease) 3/2015    cardiac cath 3/10/15: SHARONDA to LAD, SHARONDA to 1st diagonal, cardiac cath 3/7/15: BMS x2 to RCA, cardiac cath 3/6/15: SHARONDA to RCA, SHARONDA to circumflex, SHARONDA to OM     Hyperlipidemia LDL goal <70      STEMI (ST elevation myocardial infarction) (H)     inferior March 2015       PAST SURGICAL HISTORY:  Past Surgical History:   Procedure Laterality Date     HEART CATH STENT COR W/WO PTCA  3/6/2015    aspiration thrombectomy and PCI with BMS in prox and mid RCA, 80% LAD, CFX stents patent (prox CFX and OM branch)     HEART CATH STENT COR W/WO PTCA  3/7/2015    SHARONDA 1st diagonal and SHARONDA mid LAD       FAMILY HISTORY:  Family History   Problem Relation Age of Onset     Arrhythmia Father        SOCIAL HISTORY:  Social History     Socioeconomic History     Marital status: Single     Spouse name: None     Number of children: None     Years of education: None     Highest education level: None   Tobacco Use     Smoking status: Some Days     Packs/day: 0.50     Years: 30.00     Pack years: 15.00     Types: Cigarettes     Smokeless tobacco: Never     Tobacco comments:     6-8 months since last cigarette 3/31/21; quit 3/6/15 -  occas. has one  -very seldom   Substance and Sexual Activity     Alcohol use: Yes     Alcohol/week: 0.0 standard drinks     Comment: rare     Drug use: No   Other Topics Concern     Caffeine Concern No     Comment: 1 can of pop daily     Sleep Concern No     Comment: sometimes     Stress Concern No     Weight Concern No     Special Diet No     Comment: more lean meats, more greens     Exercise No       Review of Systems:  Skin:  not assessed       Eyes:  not assessed      ENT:  not assessed      Respiratory:  Negative       Cardiovascular:  Negative      Gastroenterology: not assessed     "  Genitourinary:  not assessed      Musculoskeletal:  not assessed      Neurologic:  not assessed      Psychiatric:  not assessed      Heme/Lymph/Imm:  not assessed      Endocrine:  Positive for diabetes Pt doesn't know diabetes type    Physical Exam:  Vitals: /86   Pulse 67   Ht 1.854 m (6' 1\")   Wt 89.8 kg (198 lb)   BMI 26.12 kg/m      Constitutional:  cooperative, alert and oriented, well developed, well nourished, in no acute distress        Skin:  warm and dry to the touch, no apparent skin lesions or masses noted          Head:  normocephalic, no masses or lesions        Eyes:  pupils equal and round, conjunctivae and lids unremarkable, sclera white, no xanthalasma, EOMS intact, no nystagmus        Lymph:      ENT:  no pallor or cyanosis, dentition good        Neck:    left carotid bruit      Respiratory:  normal breath sounds, clear to auscultation, normal A-P diameter, normal symmetry, normal respiratory excursion, no use of accessory muscles         Cardiac: regular rhythm                  pulses below the femoral arteries are diminished                                      GI:           Extremities and Muscular Skeletal:  no edema;no spinal abnormalities noted;normal muscle strength and tone         no edema    Neurological:  no gross motor deficits   motor grossly intact    Psych:  affect appropriate, oriented to time, person and place        CC  Claudio Palmer MD  [address]  [place] [id number]                Service Date: 11/01/2022    HISTORY OF PRESENT ILLNESS:  Ander is a very nice 57-year-old  who I first met in 03/2015 when he presented with an inferior wall myocardial infarction.  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  He has hypertension, mixed hypercholesterolemia with HDL deficiency, marked hypertriglyceridemia, diabetes mellitus.    I took him to the Cath Lab, where I performed angioplasty and stenting on his " severely diseased right coronary artery.  He had disease in his circumflex and left anterior descending artery.  He had distal embolization of clot.  I performed aspiration thrombectomy and left him with some distal emboli in his posterolateral branches.  He initially had no-reflow phenomenon, which responded to medications.  I then proceeded to intervene on the circumflex coronary artery and obtuse marginal, leaving him with his 80% mid-LAD stenosis and 80% stenosis in the first diagonal with a plan to return in a staged fashion.    That night, however, despite being on Integrilin and Brilinta, he developed stent thrombosis and was taken back to the Cath Lab by my partner, where his right coronary artery was further dilated, additional stent was placed with an apparent good result.  He continued to flounder, however, and thinking that his symptoms were due to his left anterior descending artery, we brought him back to the Cath Lab, where again his right coronary artery was found to be occluded.  After review of the films and discussion amongst various interventionalists, we decided not to make any further attempts on opening his right coronary artery and proceeded with stenting of his left anterior descending artery and diagonal.  He continued to have an atypical chest pain.  He also had problems tolerating various medications, including shortness of breath with Brilinta, so we switched to Effient.  He had problems with cold feet and lightheadedness, so we backed off on his beta blockers.    He has also seen my partner Augustine Hernández for his claudication, which was treated primarily by decreasing his metoprolol further.    He states for all intents and purposes, he has essentially quit smoking, although still has an occasional cigarette.  He thinks the last time he has had one was about a month ago.  In the wintertime, he does occasionally have a cigarette if it is snowing when he is driving.  He states it calms his  nerves, but otherwise is not smoking at all.  He states occasionally he will have 1 at a bar, and as stated, he thinks his last one was a month ago.    Ander states he is not having any chest, arm, neck, jaw or shoulder discomfort.  No dyspnea on exertion, orthopnea or PND.  No palpitations, lightheadedness, dizziness, syncope or near syncope.    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  His last one was in 03/2021 and this was a stress nuclear scan, for which he appeared to have a small area of infarct on his inferior wall.  No significant area of ischemia.  Ejection fraction was 60%.  I have told him as soon as he hears from the DOT, to let us know and we will get his test scheduled for him.  He is concerned he wants to make sure he gets it done quickly so he does not have to stop driving.  I have told him we can get it in and get it scheduled pretty fast.  If need be, he can have a followup with an CURTIS at that time to review the results.    He has no symptoms to suggest heart failure or significant arrhythmia.    I do not think he is having any angina, and we will set up the stress test once we hear from the [employer].    Blood pressure is well controlled at 135/86 with a pulse of 67.    Weight is 198 pounds, which is down 14 pounds.  When asked how he lost the weight, he states he does not know, but in talking to him further, he cut out drinking Mountain Dew and regular pop, and I think over time he has steadily lost weight because this is out of his diet.  In looking at his numbers, his glucose has steadily gotten better, hemoglobin A1c and triglycerides over time.  He is not exercising at all, unfortunately.    As stated, cholesterol is 103, HDL is 35, LDL is 37, triglycerides are 153.  Clearly, he has HDL deficiency, mixed hyperlipidemia and metabolic syndrome, although his numbers have steadily gotten better with his HDL steadily coming up, his triglyceride steadily going down.  He is on metformin and  rosuvastatin 40.    Creatinine is normal at 0.78 with normal electrolytes.    He does have a left carotid bruit, and I will set him up for a carotid ultrasound.    We also reviewed his ankle-brachial index performed by Augustine Hernández.  At this time, he is not having claudication symptoms.    Claudio Palmer MD, [facility]        D: 2022   T: 2022   MT:     Name:     ROB CARRILLO  MRN:      [id number]        Account:      [id number]   :      1965           Service Date: 2022       Document: [id number]      Thank you for allowing me to participate in the care of your patient.      Sincerely,     Claudio Palmer MD     [facility]  cc:   Claudio Palmer MD  [address]  [place] [address]

## 2022-11-02 NOTE — PROGRESS NOTES
Service Date: 11/01/2022    HISTORY OF PRESENT ILLNESS:  Ander is a very nice 57-year-old  who I first met in 03/2015 when he presented with an inferior wall myocardial infarction.  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  He has hypertension, mixed hypercholesterolemia with HDL deficiency, marked hypertriglyceridemia, diabetes mellitus.    I took him to the Cath Lab, where I performed angioplasty and stenting on his severely diseased right coronary artery.  He had disease in his circumflex and left anterior descending artery.  He had distal embolization of clot.  I performed aspiration thrombectomy and left him with some distal emboli in his posterolateral branches.  He initially had no-reflow phenomenon, which responded to medications.  I then proceeded to intervene on the circumflex coronary artery and obtuse marginal, leaving him with his 80% mid-LAD stenosis and 80% stenosis in the first diagonal with a plan to return in a staged fashion.    That night, however, despite being on Integrilin and Brilinta, he developed stent thrombosis and was taken back to the Cath Lab by my partner, where his right coronary artery was further dilated, additional stent was placed with an apparent good result.  He continued to flounder, however, and thinking that his symptoms were due to his left anterior descending artery, we brought him back to the Cath Lab, where again his right coronary artery was found to be occluded.  After review of the films and discussion amongst various interventionalists, we decided not to make any further attempts on opening his right coronary artery and proceeded with stenting of his left anterior descending artery and diagonal.  He continued to have an atypical chest pain.  He also had problems tolerating various medications, including shortness of breath with Brilinta, so we switched to Effient.  He had problems with cold feet and lightheadedness, so  we backed off on his beta blockers.    He has also seen my partner Augustine Hernández for his claudication, which was treated primarily by decreasing his metoprolol further.    He states for all intents and purposes, he has essentially quit smoking, although still has an occasional cigarette.  He thinks the last time he has had one was about a month ago.  In the wintertime, he does occasionally have a cigarette if it is snowing when he is driving.  He states it calms his nerves, but otherwise is not smoking at all.  He states occasionally he will have 1 at a bar, and as stated, he thinks his last one was a month ago.    Ander states he is not having any chest, arm, neck, jaw or shoulder discomfort.  No dyspnea on exertion, orthopnea or PND.  No palpitations, lightheadedness, dizziness, syncope or near syncope.    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  His last one was in 03/2021 and this was a stress nuclear scan, for which he appeared to have a small area of infarct on his inferior wall.  No significant area of ischemia.  Ejection fraction was 60%.  I have told him as soon as he hears from the DOT, to let us know and we will get his test scheduled for him.  He is concerned he wants to make sure he gets it done quickly so he does not have to stop driving.  I have told him we can get it in and get it scheduled pretty fast.  If need be, he can have a followup with an CURTIS at that time to review the results.    He has no symptoms to suggest heart failure or significant arrhythmia.    I do not think he is having any angina, and we will set up the stress test once we hear from the [facility].    Blood pressure is well controlled at 135/86 with a pulse of 67.    Weight is 198 pounds, which is down 14 pounds.  When asked how he lost the weight, he states he does not know, but in talking to him further, he cut out drinking Mountain Dew and regular pop, and I think over time he has steadily lost weight because this is out of his  diet.  In looking at his numbers, his glucose has steadily gotten better, hemoglobin A1c and triglycerides over time.  He is not exercising at all, unfortunately.    As stated, cholesterol is 103, HDL is 35, LDL is 37, triglycerides are 153.  Clearly, he has HDL deficiency, mixed hyperlipidemia and metabolic syndrome, although his numbers have steadily gotten better with his HDL steadily coming up, his triglyceride steadily going down.  He is on metformin and rosuvastatin 40.    Creatinine is normal at 0.78 with normal electrolytes.    He does have a left carotid bruit, and I will set him up for a carotid ultrasound.    We also reviewed his ankle-brachial index performed by Augustine Hernández.  At this time, he is not having claudication symptoms.    Claudio Palmer MD, [facility]        D: 2022   T: 2022   MT: escobar    Name:     ROB CARRILLO  MRN:      [id number]        Account:      [id number]   :      1965           Service Date: 2022       Document: [id number]

## 2022-12-08 ENCOUNTER — HOSPITAL ENCOUNTER (OUTPATIENT)
Dept: ULTRASOUND IMAGING | Facility: CLINIC | Age: 57
Discharge: HOME | End: 2022-12-08
Attending: INTERNAL MEDICINE | Admitting: INTERNAL MEDICINE
Payer: COMMERCIAL

## 2022-12-08 ENCOUNTER — TELEPHONE (OUTPATIENT)
Dept: CARDIOLOGY | Facility: CLINIC | Age: 57
End: 2022-12-08

## 2022-12-08 DIAGNOSIS — R09.89 LEFT CAROTID BRUIT: ICD-10-CM

## 2022-12-08 PROCEDURE — 93880 EXTRACRANIAL BILAT STUDY: CPT

## 2022-12-08 NOTE — TELEPHONE ENCOUNTER
US carotid 12/8/2022 noted. Ordered for finding of left carotid bruit with known CAD history.  Awaiting plan for DOT stress testing request this year.    US carotid  1. Less than 50% diameter stenosis of the right ICA relative to the distal ICA diameter.  2. Less than 50% diameter stenosis of the left ICA relative to the distal ICA diameter.    Patient to return in 1 year    Will message Dr. Palmer to review

## 2022-12-09 DIAGNOSIS — I25.10 CORONARY ARTERY DISEASE INVOLVING NATIVE CORONARY ARTERY OF NATIVE HEART WITHOUT ANGINA PECTORIS: ICD-10-CM

## 2022-12-09 DIAGNOSIS — E78.2 MIXED HYPERLIPIDEMIA: Chronic | ICD-10-CM

## 2022-12-09 RX ORDER — ROSUVASTATIN CALCIUM 40 MG/1
40 TABLET, COATED ORAL DAILY
Qty: 90 TABLET | Refills: 3 | Status: SHIPPED | OUTPATIENT
Start: 2022-12-09 | End: 2023-11-29

## 2022-12-12 NOTE — TELEPHONE ENCOUNTER
Reply from Dr. Palmer:  Neither are significant. Continue with med rx which treats all arteries of the body.      Called patient with results of US carotid as stable. He is due for his DOT testing in March. He will check on which stress test is needed this year and call as soon as he has that information. Reviewed with patient that it could take a month or more to find an appointment.

## 2023-02-14 ENCOUNTER — TELEPHONE (OUTPATIENT)
Dept: CARDIOLOGY | Facility: CLINIC | Age: 58
End: 2023-02-14
Payer: COMMERCIAL

## 2023-02-14 DIAGNOSIS — I25.10 CORONARY ARTERY DISEASE INVOLVING NATIVE CORONARY ARTERY OF NATIVE HEART WITHOUT ANGINA PECTORIS: Primary | Chronic | ICD-10-CM

## 2023-02-14 NOTE — TELEPHONE ENCOUNTER
Dr. Palmer's reply - Lets do a stress nuclear scan holding his beta-blockers the day before and day of the test

## 2023-02-14 NOTE — TELEPHONE ENCOUNTER
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     Will make a CURTIS F/Up visit to review test and update note for DOT.     Last Dr. Palmer OV 11-1-22.

## 2023-02-14 NOTE — TELEPHONE ENCOUNTER
Exercise stress nuclear test order placed. With BB hold prior.     Detailed message left for Patient regarding to call scheduling to arrange appointment.   Also nessaged scheduling to call and arrange.

## 2023-03-01 ENCOUNTER — TELEPHONE (OUTPATIENT)
Dept: CARDIOLOGY | Facility: CLINIC | Age: 58
End: 2023-03-01
Payer: COMMERCIAL

## 2023-03-01 DIAGNOSIS — I25.10 CORONARY ARTERY DISEASE INVOLVING NATIVE CORONARY ARTERY OF NATIVE HEART WITHOUT ANGINA PECTORIS: Primary | Chronic | ICD-10-CM

## 2023-03-01 NOTE — TELEPHONE ENCOUNTER
Voicemail received from patient calling to say he became ill last night, does not think he can do the stress test tomorrow. He wonders what to do.     Returned patient's call, left a message with scheduling phone number to r/s stress test until he is feeling better.

## 2023-03-10 ENCOUNTER — HOSPITAL ENCOUNTER (OUTPATIENT)
Dept: NUCLEAR MEDICINE | Facility: CLINIC | Age: 58
Setting detail: NUCLEAR MEDICINE
Discharge: HOME | End: 2023-03-10
Attending: INTERNAL MEDICINE
Payer: COMMERCIAL

## 2023-03-10 ENCOUNTER — HOSPITAL ENCOUNTER (OUTPATIENT)
Dept: CARDIOLOGY | Facility: CLINIC | Age: 58
Discharge: HOME | End: 2023-03-10
Attending: INTERNAL MEDICINE
Payer: COMMERCIAL

## 2023-03-10 DIAGNOSIS — I25.10 CORONARY ARTERY DISEASE INVOLVING NATIVE CORONARY ARTERY OF NATIVE HEART WITHOUT ANGINA PECTORIS: Chronic | ICD-10-CM

## 2023-03-10 LAB
STRESS ECHO POST ESTIMATED WORKLOAD: 8 METS
STRESS ECHO POST EXERCISE DUR MIN: 6 MIN
STRESS ECHO POST EXERCISE DUR SEC: 29 SEC
STRESS ECHO TARGET HR: 161

## 2023-03-10 PROCEDURE — 78452 HT MUSCLE IMAGE SPECT MULT: CPT

## 2023-03-10 PROCEDURE — 343N000001 HC RX 343: Performed by: INTERNAL MEDICINE

## 2023-03-10 PROCEDURE — 93016 CV STRESS TEST SUPVJ ONLY: CPT | Performed by: INTERNAL MEDICINE

## 2023-03-10 PROCEDURE — 93017 CV STRESS TEST TRACING ONLY: CPT

## 2023-03-10 PROCEDURE — 78452 HT MUSCLE IMAGE SPECT MULT: CPT | Mod: 26 | Performed by: INTERNAL MEDICINE

## 2023-03-10 PROCEDURE — A9502 TC99M TETROFOSMIN: HCPCS | Performed by: INTERNAL MEDICINE

## 2023-03-10 PROCEDURE — 93018 CV STRESS TEST I&R ONLY: CPT | Performed by: INTERNAL MEDICINE

## 2023-03-10 RX ADMIN — TETROFOSMIN 10.4 MILLICURIE: 1.38 INJECTION, POWDER, LYOPHILIZED, FOR SOLUTION INTRAVENOUS at 07:58

## 2023-03-10 RX ADMIN — TETROFOSMIN 33 MILLICURIE: 1.38 INJECTION, POWDER, LYOPHILIZED, FOR SOLUTION INTRAVENOUS at 09:34

## 2023-03-17 ENCOUNTER — OFFICE VISIT (OUTPATIENT)
Dept: CARDIOLOGY | Facility: CLINIC | Age: 58
End: 2023-03-17
Payer: COMMERCIAL

## 2023-03-17 VITALS
HEART RATE: 76 BPM | OXYGEN SATURATION: 98 % | BODY MASS INDEX: 25.33 KG/M2 | SYSTOLIC BLOOD PRESSURE: 118 MMHG | DIASTOLIC BLOOD PRESSURE: 78 MMHG | WEIGHT: 191.1 LBS | HEIGHT: 73 IN

## 2023-03-17 DIAGNOSIS — I25.10 CORONARY ARTERY DISEASE INVOLVING NATIVE CORONARY ARTERY OF NATIVE HEART WITHOUT ANGINA PECTORIS: Primary | Chronic | ICD-10-CM

## 2023-03-17 DIAGNOSIS — I10 BENIGN ESSENTIAL HYPERTENSION: ICD-10-CM

## 2023-03-17 DIAGNOSIS — E78.6 HDL DEFICIENCY: ICD-10-CM

## 2023-03-17 DIAGNOSIS — E11.9 TYPE 2 DIABETES MELLITUS WITHOUT COMPLICATION, WITHOUT LONG-TERM CURRENT USE OF INSULIN (H): ICD-10-CM

## 2023-03-17 DIAGNOSIS — E78.2 MIXED HYPERLIPIDEMIA: Chronic | ICD-10-CM

## 2023-03-17 PROCEDURE — 99214 OFFICE O/P EST MOD 30 MIN: CPT | Performed by: INTERNAL MEDICINE

## 2023-03-17 NOTE — PROGRESS NOTES
HPI and Plan:    Ander is a very nice 57-year-old  who I first met in 03/2015 when he presented with an inferior wall myocardial infarction.  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  He has hypertension, mixed hypercholesterolemia with HDL deficiency, marked hypertriglyceridemia, diabetes mellitus.     I took him to the Cath Lab, where I performed angioplasty and stenting on his severely diseased right coronary artery.  He had disease in his circumflex and left anterior descending artery.  He had distal embolization of clot.  I performed aspiration thrombectomy and left him with some distal emboli in his posterolateral branches.  He initially had no-reflow phenomenon, which responded to medications.  I then proceeded to intervene on the circumflex coronary artery and obtuse marginal, leaving him with his 80% mid-LAD stenosis and 80% stenosis in the first diagonal with a plan to return in a staged fashion.     That night, however, despite being on Integrilin and Brilinta, he developed stent thrombosis and was taken back to the Cath Lab by my partner, where his right coronary artery was further dilated, additional stent was placed with an apparent good result.  He continued to flounder, however, and thinking that his symptoms were due to his left anterior descending artery, we brought him back to the Cath Lab, where again his right coronary artery was found to be occluded.  After review of the films and discussion amongst various interventionalists, we decided not to make any further attempts on opening his right coronary artery and proceeded with stenting of his left anterior descending artery and diagonal.  He continued to have an atypical chest pain.  He also had problems tolerating various medications, including shortness of breath with Brilinta, so we switched to Effient.  He had problems with cold feet and lightheadedness, so we backed off on his beta  blockers.     He has also seen my partner Augustine Hernández for his  leg pain possible claudication, which was treated primarily by decreasing his metoprolol further.     He states for all intents and purposes, he has essentially quit smoking, although still has an occasional cigarette.  He thinks the last time he has had one was about a month ago.  In the wintertime, he does occasionally have a cigarette if it is snowing when he is driving.  He states it calms his nerves, but otherwise is not smoking at all.  He states occasionally he will have 1 at a bar, and as stated, he thinks his last one was a month ago.     Ander states he is not having any chest, arm, neck, jaw or shoulder discomfort.  No dyspnea on exertion, orthopnea or PND.  No palpitations, lightheadedness, dizziness, syncope or near syncope.    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    For the [employer] we did a stress nuclear scan.  He was able to exercise 6-1/2 minutes without symptoms.  Nuclear scan demonstrated fixed inferior defect without evidence of ischemia ejection fraction of 70%.  He meets with the [employer] next week.    Assessment and plan.  Ander appears to be doing well from a cardiac standpoint without clinical evidence of ischemia.  He has no symptoms to suggest heart failure or significant arrhythmia.    Of note he is lost 20 pounds of weight and he states this is because his sugars were high and he gave up his Mountain Dew.  He unfortunately is not exercising as noted above.  I have emphasized importance of a regular exercise regiment.    Blood pressure is well controlled.  With his weight loss body mass index is now 25.2.  With cloths on.    Fasting lipid profile from September is outstanding and this is before his weight loss.  I will check it again when he returns next year.  My plan is to follow-up in 1 year if he should have any problems I be glad to see him sooner.  Thank you for allowing me to  participate in his care.  Sincerely Claudio Spencer      Today's clinic visit entailed:  Review of the result(s) of each unique test - Stress nuclear scan, lab work  Ordering of each unique test  Prescription drug management  35 minutes spent on the date of the encounter doing chart review, history and exam, documentation and further activities per the note  Provider  Link to [facility] Help Grid     The level of medical decision making during this visit was of moderate complexity.      Orders Placed This Encounter   Procedures     Basic metabolic panel     Lipid Profile     ALT     Follow-Up with Cardiology       No orders of the defined types were placed in this encounter.      There are no discontinued medications.      Encounter Diagnoses   Name Primary?     Coronary artery disease involving native coronary artery of native heart without angina pectoris Yes     Mixed hyperlipidemia      Benign essential hypertension      HDL deficiency      Type 2 diabetes mellitus without complication, without long-term current use of insulin (H)        CURRENT MEDICATIONS:  Current Outpatient Medications   Medication Sig Dispense Refill     aspirin 81 MG chewable tablet Take 1 tablet (81 mg) by mouth daily 36 tablet 0     metFORMIN (GLUCOPHAGE) 500 MG tablet Take 1 tablet by mouth 2 times daily (with meals)       metoprolol succinate ER (TOPROL XL) 25 MG 24 hr tablet Take 1 tablet (25 mg) by mouth daily 90 tablet 2     nitroGLYcerin (NITROSTAT) 0.4 MG sublingual tablet Place 1 tablet (0.4 mg) under the tongue every 5 minutes as needed for chest pain. 25 tablet 0     rosuvastatin (CRESTOR) 40 MG tablet Take 1 tablet (40 mg) by mouth daily 90 tablet 3       ALLERGIES   No Known Allergies    PAST MEDICAL HISTORY:  Past Medical History:   Diagnosis Date     Angina pectoris (H)      Benign essential hypertension      CAD (coronary artery disease) 3/2015    cardiac cath 3/10/15: SHARONDA to LAD, SHARONDA to 1st diagonal, cardiac cath 3/7/15: BMS  x2 to RCA, cardiac cath 3/6/15: SHARONDA to RCA, SHARONDA to circumflex, SHARONDA to OM     Hyperlipidemia LDL goal <70      STEMI (ST elevation myocardial infarction) (H)     inferior March 2015       PAST SURGICAL HISTORY:  Past Surgical History:   Procedure Laterality Date     HEART CATH STENT COR W/WO PTCA  3/6/2015    aspiration thrombectomy and PCI with BMS in prox and mid RCA, 80% LAD, CFX stents patent (prox CFX and OM branch)     HEART CATH STENT COR W/WO PTCA  3/7/2015    SHARONDA 1st diagonal and SHARONDA mid LAD       FAMILY HISTORY:  Family History   Problem Relation Age of Onset     Arrhythmia Father        SOCIAL HISTORY:  Social History     Socioeconomic History     Marital status: Single     Spouse name: None     Number of children: None     Years of education: None     Highest education level: None   Tobacco Use     Smoking status: Some Days     Packs/day: 0.50     Years: 30.00     Pack years: 15.00     Types: Cigarettes     Smokeless tobacco: Never     Tobacco comments:     6-8 months since last cigarette 3/31/21; quit 3/6/15 -  occas. has one  -very seldom   Substance and Sexual Activity     Alcohol use: Yes     Alcohol/week: 0.0 standard drinks     Comment: rare     Drug use: No   Other Topics Concern     Caffeine Concern No     Comment: 1 can of pop daily     Sleep Concern No     Comment: sometimes     Stress Concern No     Weight Concern No     Special Diet No     Comment: more lean meats, more greens     Exercise No       Review of Systems:  Skin:  Negative       Eyes:  Negative      ENT:  Negative      Respiratory:  Positive for cough dry cough was sick 2wks ago   Cardiovascular:  Negative      Gastroenterology: Negative      Genitourinary:  Negative      Musculoskeletal:  Positive for joint pain    Neurologic:  Negative      Psychiatric:  Negative      Heme/Lymph/Imm:  Negative      Endocrine:  Positive for diabetes      Physical Exam:  Vitals: /78 (BP Location: Right arm, Patient Position: Sitting, Cuff  "Size: Adult Regular)   Pulse 76   Ht 1.854 m (6' 1\")   Wt 86.7 kg (191 lb 1.6 oz)   SpO2 98%   BMI 25.21 kg/m      Constitutional:  cooperative, alert and oriented, well developed, well nourished, in no acute distress thin      Skin:  warm and dry to the touch, no apparent skin lesions or masses noted          Head:  normocephalic, no masses or lesions        Eyes:  pupils equal and round, conjunctivae and lids unremarkable, sclera white, no xanthalasma, EOMS intact, no nystagmus        Lymph:      ENT:  no pallor or cyanosis, dentition good        Neck:  JVP normal left carotid bruit      Respiratory:  normal breath sounds, clear to auscultation, normal A-P diameter, normal symmetry, normal respiratory excursion, no use of accessory muscles         Cardiac: regular rhythm             RRR without pathological murmur    pulses below the femoral arteries are diminished                                 2+ bilateral radial pulses distal to catheter insertion site, quick capillary refill    GI:      benign    Extremities and Muscular Skeletal:  no edema;no spinal abnormalities noted;normal muscle strength and tone         no edema    Neurological:  no gross motor deficits   motor grossly intact    Psych:  affect appropriate, oriented to time, person and place        CC  Claudio Palmer MD  [address]  [place] [id number]              "

## 2023-03-17 NOTE — LETTER
3/17/2023    Jose Yip MD  [facility] [address]  [facility] [id number]    RE: Seun Rudolph       Dear Colleague,     I had the pleasure of seeing Ander Doyle in the [facility].  HPI and Plan:    Ander is a very nice 57-year-old  who I first met in 03/2015 when he presented with an inferior wall myocardial infarction.  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  He has hypertension, mixed hypercholesterolemia with HDL deficiency, marked hypertriglyceridemia, diabetes mellitus.     I took him to the Cath Lab, where I performed angioplasty and stenting on his severely diseased right coronary artery.  He had disease in his circumflex and left anterior descending artery.  He had distal embolization of clot.  I performed aspiration thrombectomy and left him with some distal emboli in his posterolateral branches.  He initially had no-reflow phenomenon, which responded to medications.  I then proceeded to intervene on the circumflex coronary artery and obtuse marginal, leaving him with his 80% mid-LAD stenosis and 80% stenosis in the first diagonal with a plan to return in a staged fashion.     That night, however, despite being on Integrilin and Brilinta, he developed stent thrombosis and was taken back to the Cath Lab by my partner, where his right coronary artery was further dilated, additional stent was placed with an apparent good result.  He continued to flounder, however, and thinking that his symptoms were due to his left anterior descending artery, we brought him back to the Cath Lab, where again his right coronary artery was found to be occluded.  After review of the films and discussion amongst various interventionalists, we decided not to make any further attempts on opening his right coronary artery and proceeded with stenting of his left anterior descending artery and diagonal.  He continued to have an  atypical chest pain.  He also had problems tolerating various medications, including shortness of breath with Brilinta, so we switched to Effient.  He had problems with cold feet and lightheadedness, so we backed off on his beta blockers.     He has also seen my partner Augustine Hernández for his  leg pain possible claudication, which was treated primarily by decreasing his metoprolol further.     He states for all intents and purposes, he has essentially quit smoking, although still has an occasional cigarette.  He thinks the last time he has had one was about a month ago.  In the wintertime, he does occasionally have a cigarette if it is snowing when he is driving.  He states it calms his nerves, but otherwise is not smoking at all.  He states occasionally he will have 1 at a bar, and as stated, he thinks his last one was a month ago.     Ander states he is not having any chest, arm, neck, jaw or shoulder discomfort.  No dyspnea on exertion, orthopnea or PND.  No palpitations, lightheadedness, dizziness, syncope or near syncope.    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    For the [employer] we did a stress nuclear scan.  He was able to exercise 6-1/2 minutes without symptoms.  Nuclear scan demonstrated fixed inferior defect without evidence of ischemia ejection fraction of 70%.  He meets with the [employer] next week.    Assessment and plan.  Ander appears to be doing well from a cardiac standpoint without clinical evidence of ischemia.  He has no symptoms to suggest heart failure or significant arrhythmia.    Of note he is lost 20 pounds of weight and he states this is because his sugars were high and he gave up his Mountain Dew.  He unfortunately is not exercising as noted above.  I have emphasized importance of a regular exercise regiment.    Blood pressure is well controlled.  With his weight loss body mass index is now 25.2.  With cloths on.    Fasting lipid profile from September is  outstanding and this is before his weight loss.  I will check it again when he returns next year.  My plan is to follow-up in 1 year if he should have any problems I be glad to see him sooner.  Thank you for allowing me to participate in his care.  Sincerely Claudio Palmer      Today's clinic visit entailed:  Review of the result(s) of each unique test - Stress nuclear scan, lab work  Ordering of each unique test  Prescription drug management  35 minutes spent on the date of the encounter doing chart review, history and exam, documentation and further activities per the note  Provider  Link to [facility] Help Grid     The level of medical decision making during this visit was of moderate complexity.      Orders Placed This Encounter   Procedures     Basic metabolic panel     Lipid Profile     ALT     Follow-Up with Cardiology       No orders of the defined types were placed in this encounter.      There are no discontinued medications.      Encounter Diagnoses   Name Primary?     Coronary artery disease involving native coronary artery of native heart without angina pectoris Yes     Mixed hyperlipidemia      Benign essential hypertension      HDL deficiency      Type 2 diabetes mellitus without complication, without long-term current use of insulin (H)        CURRENT MEDICATIONS:  Current Outpatient Medications   Medication Sig Dispense Refill     aspirin 81 MG chewable tablet Take 1 tablet (81 mg) by mouth daily 36 tablet 0     metFORMIN (GLUCOPHAGE) 500 MG tablet Take 1 tablet by mouth 2 times daily (with meals)       metoprolol succinate ER (TOPROL XL) 25 MG 24 hr tablet Take 1 tablet (25 mg) by mouth daily 90 tablet 2     nitroGLYcerin (NITROSTAT) 0.4 MG sublingual tablet Place 1 tablet (0.4 mg) under the tongue every 5 minutes as needed for chest pain. 25 tablet 0     rosuvastatin (CRESTOR) 40 MG tablet Take 1 tablet (40 mg) by mouth daily 90 tablet 3       ALLERGIES   No Known Allergies    PAST MEDICAL  HISTORY:  Past Medical History:   Diagnosis Date     Angina pectoris (H)      Benign essential hypertension      CAD (coronary artery disease) 3/2015    cardiac cath 3/10/15: SHARONDA to LAD, SHARONDA to 1st diagonal, cardiac cath 3/7/15: BMS x2 to RCA, cardiac cath 3/6/15: SHARONDA to RCA, SHARONDA to circumflex, SHARONDA to OM     Hyperlipidemia LDL goal <70      STEMI (ST elevation myocardial infarction) (H)     inferior March 2015       PAST SURGICAL HISTORY:  Past Surgical History:   Procedure Laterality Date     HEART CATH STENT COR W/WO PTCA  3/6/2015    aspiration thrombectomy and PCI with BMS in prox and mid RCA, 80% LAD, CFX stents patent (prox CFX and OM branch)     HEART CATH STENT COR W/WO PTCA  3/7/2015    SHARONDA 1st diagonal and SHARONDA mid LAD       FAMILY HISTORY:  Family History   Problem Relation Age of Onset     Arrhythmia Father        SOCIAL HISTORY:  Social History     Socioeconomic History     Marital status: Single     Spouse name: None     Number of children: None     Years of education: None     Highest education level: None   Tobacco Use     Smoking status: Some Days     Packs/day: 0.50     Years: 30.00     Pack years: 15.00     Types: Cigarettes     Smokeless tobacco: Never     Tobacco comments:     6-8 months since last cigarette 3/31/21; quit 3/6/15 -  occas. has one  -very seldom   Substance and Sexual Activity     Alcohol use: Yes     Alcohol/week: 0.0 standard drinks     Comment: rare     Drug use: No   Other Topics Concern     Caffeine Concern No     Comment: 1 can of pop daily     Sleep Concern No     Comment: sometimes     Stress Concern No     Weight Concern No     Special Diet No     Comment: more lean meats, more greens     Exercise No       Review of Systems:  Skin:  Negative       Eyes:  Negative      ENT:  Negative      Respiratory:  Positive for cough dry cough was sick 2wks ago   Cardiovascular:  Negative      Gastroenterology: Negative      Genitourinary:  Negative      Musculoskeletal:  Positive for  "joint pain    Neurologic:  Negative      Psychiatric:  Negative      Heme/Lymph/Imm:  Negative      Endocrine:  Positive for diabetes      Physical Exam:  Vitals: /78 (BP Location: Right arm, Patient Position: Sitting, Cuff Size: Adult Regular)   Pulse 76   Ht 1.854 m (6' 1\")   Wt 86.7 kg (191 lb 1.6 oz)   SpO2 98%   BMI 25.21 kg/m      Constitutional:  cooperative, alert and oriented, well developed, well nourished, in no acute distress thin      Skin:  warm and dry to the touch, no apparent skin lesions or masses noted          Head:  normocephalic, no masses or lesions        Eyes:  pupils equal and round, conjunctivae and lids unremarkable, sclera white, no xanthalasma, EOMS intact, no nystagmus        Lymph:      ENT:  no pallor or cyanosis, dentition good        Neck:  JVP normal left carotid bruit      Respiratory:  normal breath sounds, clear to auscultation, normal A-P diameter, normal symmetry, normal respiratory excursion, no use of accessory muscles         Cardiac: regular rhythm             RRR without pathological murmur    pulses below the femoral arteries are diminished                                 2+ bilateral radial pulses distal to catheter insertion site, quick capillary refill    GI:      benign    Extremities and Muscular Skeletal:  no edema;no spinal abnormalities noted;normal muscle strength and tone         no edema    Neurological:  no gross motor deficits   motor grossly intact    Psych:  affect appropriate, oriented to time, person and place        CC  Claudio Palmer MD  [address] [facility] [address]  [place] [address]    Thank you for allowing me to participate in the care of your patient.      Sincerely,   Claudio Palmer MD   [facility]  "

## 2023-03-22 ENCOUNTER — TELEPHONE (OUTPATIENT)
Dept: CARDIOLOGY | Facility: CLINIC | Age: 58
End: 2023-03-22
Payer: COMMERCIAL

## 2023-03-22 NOTE — TELEPHONE ENCOUNTER
Called [facility] team. The fax # provided by the patient was incorrect. refaxed the nuclear study to [phone].

## 2023-03-22 NOTE — TELEPHONE ENCOUNTER
[facility] Call Center    Phone Message    May a detailed message be left on voicemail: no     Reason for Call: Other: Rebecca called from [employer] to speak with Janelle Morin RN    Rebecca would like to advise they have not received the copy of the nuclear study that was faxed to them. Is it possible for the copy to be E-mailed to: [email or web address]  Please call to clarify as soon as possible as Pt is currently in-clinic. Phone# [phone].     Action Taken: Other:[place] Cardiology    Travel Screening: Not Applicable

## 2023-04-17 DIAGNOSIS — I10 BENIGN ESSENTIAL HYPERTENSION: ICD-10-CM

## 2023-04-17 DIAGNOSIS — I21.11 ST ELEVATION MYOCARDIAL INFARCTION INVOLVING RIGHT CORONARY ARTERY (H): ICD-10-CM

## 2023-04-17 RX ORDER — METOPROLOL SUCCINATE 25 MG/1
25 TABLET, EXTENDED RELEASE ORAL DAILY
Qty: 90 TABLET | Refills: 3 | Status: SHIPPED | OUTPATIENT
Start: 2023-04-17 | End: 2024-03-06

## 2023-11-29 DIAGNOSIS — E78.2 MIXED HYPERLIPIDEMIA: Chronic | ICD-10-CM

## 2023-11-29 DIAGNOSIS — I25.10 CORONARY ARTERY DISEASE INVOLVING NATIVE CORONARY ARTERY OF NATIVE HEART WITHOUT ANGINA PECTORIS: ICD-10-CM

## 2023-11-29 RX ORDER — ROSUVASTATIN CALCIUM 40 MG/1
40 TABLET, COATED ORAL DAILY
Qty: 90 TABLET | Refills: 0 | Status: SHIPPED | OUTPATIENT
Start: 2023-11-29 | End: 2024-03-06

## 2023-11-29 NOTE — TELEPHONE ENCOUNTER
[facility] Cardiology Refill Guideline reviewed.  Medication does not meet criteria for refill due to needs Lipids/ALT.  Messaged to providers team for further review.

## 2024-03-05 ENCOUNTER — LAB (OUTPATIENT)
Dept: LAB | Facility: CLINIC | Age: 59
End: 2024-03-05
Payer: COMMERCIAL

## 2024-03-05 DIAGNOSIS — I25.10 CORONARY ARTERY DISEASE INVOLVING NATIVE CORONARY ARTERY OF NATIVE HEART WITHOUT ANGINA PECTORIS: Chronic | ICD-10-CM

## 2024-03-05 LAB
ALT SERPL W P-5'-P-CCNC: 20 U/L (ref 0–70)
ANION GAP SERPL CALCULATED.3IONS-SCNC: 9 MMOL/L (ref 7–15)
BUN SERPL-MCNC: 10.4 MG/DL (ref 8–23)
CALCIUM SERPL-MCNC: 9.2 MG/DL (ref 8.8–10.2)
CHLORIDE SERPL-SCNC: 103 MMOL/L (ref 98–107)
CHOLEST SERPL-MCNC: 89 MG/DL
CREAT SERPL-MCNC: 0.96 MG/DL (ref 0.67–1.17)
DEPRECATED HCO3 PLAS-SCNC: 29 MMOL/L (ref 22–29)
EGFRCR SERPLBLD CKD-EPI 2021: 90 ML/MIN/1.73M2
FASTING STATUS PATIENT QL REPORTED: YES
GLUCOSE SERPL-MCNC: 123 MG/DL (ref 70–99)
HDLC SERPL-MCNC: 32 MG/DL
LDLC SERPL CALC-MCNC: 32 MG/DL
NONHDLC SERPL-MCNC: 57 MG/DL
POTASSIUM SERPL-SCNC: 4.3 MMOL/L (ref 3.4–5.3)
SODIUM SERPL-SCNC: 141 MMOL/L (ref 135–145)
TRIGL SERPL-MCNC: 124 MG/DL

## 2024-03-05 PROCEDURE — 80061 LIPID PANEL: CPT | Performed by: INTERNAL MEDICINE

## 2024-03-05 PROCEDURE — 36415 COLL VENOUS BLD VENIPUNCTURE: CPT | Performed by: INTERNAL MEDICINE

## 2024-03-05 PROCEDURE — 80048 BASIC METABOLIC PNL TOTAL CA: CPT | Performed by: INTERNAL MEDICINE

## 2024-03-05 PROCEDURE — 84460 ALANINE AMINO (ALT) (SGPT): CPT | Performed by: INTERNAL MEDICINE

## 2024-03-06 ENCOUNTER — OFFICE VISIT (OUTPATIENT)
Dept: CARDIOLOGY | Facility: CLINIC | Age: 59
End: 2024-03-06
Payer: COMMERCIAL

## 2024-03-06 VITALS
HEIGHT: 73 IN | DIASTOLIC BLOOD PRESSURE: 82 MMHG | WEIGHT: 199.8 LBS | BODY MASS INDEX: 26.48 KG/M2 | SYSTOLIC BLOOD PRESSURE: 138 MMHG | HEART RATE: 72 BPM

## 2024-03-06 DIAGNOSIS — I25.10 CORONARY ARTERY DISEASE INVOLVING NATIVE CORONARY ARTERY OF NATIVE HEART WITHOUT ANGINA PECTORIS: Chronic | ICD-10-CM

## 2024-03-06 DIAGNOSIS — I10 BENIGN ESSENTIAL HYPERTENSION: ICD-10-CM

## 2024-03-06 DIAGNOSIS — E78.6 HDL DEFICIENCY: ICD-10-CM

## 2024-03-06 DIAGNOSIS — E11.9 TYPE 2 DIABETES MELLITUS WITHOUT COMPLICATION, WITHOUT LONG-TERM CURRENT USE OF INSULIN (H): ICD-10-CM

## 2024-03-06 DIAGNOSIS — Z72.0 TOBACCO ABUSE: Primary | ICD-10-CM

## 2024-03-06 DIAGNOSIS — E88.810 METABOLIC SYNDROME X: ICD-10-CM

## 2024-03-06 DIAGNOSIS — E78.2 MIXED HYPERLIPIDEMIA: Chronic | ICD-10-CM

## 2024-03-06 PROCEDURE — 99214 OFFICE O/P EST MOD 30 MIN: CPT | Performed by: INTERNAL MEDICINE

## 2024-03-06 RX ORDER — ROSUVASTATIN CALCIUM 40 MG/1
40 TABLET, COATED ORAL DAILY
Qty: 90 TABLET | Refills: 3 | Status: SHIPPED | OUTPATIENT
Start: 2024-03-06

## 2024-03-06 RX ORDER — METOPROLOL SUCCINATE 25 MG/1
25 TABLET, EXTENDED RELEASE ORAL DAILY
Qty: 90 TABLET | Refills: 3 | Status: SHIPPED | OUTPATIENT
Start: 2024-03-06

## 2024-03-06 RX ORDER — NITROGLYCERIN 0.4 MG/1
TABLET SUBLINGUAL
Qty: 25 TABLET | Refills: 0 | Status: SHIPPED | OUTPATIENT
Start: 2024-03-06

## 2024-03-06 NOTE — LETTER
3/6/2024    Jose Yip MD  [facility] [address]  [facility] [id number]    RE: Ander Doyle       Dear Colleague,     I had the pleasure of seeing Ander Doyle in the [facility].  HPI and Plan:   Ander is a very nice 58-year-old  who I first met in 03/2015 when he presented with an inferior wall myocardial infarction.  At that time he was smoking 1-1/2 packs of cigarettes per day.  He has hypertension, mixed hypercholesterolemia with HDL deficiency, marked hypertriglyceridemia and diabetes mellitus.     I took him to the Cath Lab, where I stented  his severely diseased right coronary artery.  He had disease in his circumflex and left anterior descending artery.  He had distal embolization of clot.  I performed aspiration thrombectomy and left him with some distal emboli in his posterolateral branches.  He initially had no-reflow phenomenon, which responded to medications.  I then proceeded to intervene on the circumflex coronary artery and obtuse marginal, leaving him with his 80% mid-LAD stenosis and 80% stenosis in the first diagonal with a plan to return in a staged fashion.     That night, however, despite being on Integrilin and Brilinta, he developed stent thrombosis and was taken back to the Cath Lab by my partner, where his right coronary artery was further dilated, additional stent was placed with an apparent good result.  He continued to flounder. Thinking that his symptoms were due to his left anterior descending artery, we brought him back to the Cath Lab, where again his right coronary artery was found to be occluded.  After review of the films and discussion amongst various interventionalists, we decided not to make any further attempts on opening his right coronary artery and proceeded with stenting of his left anterior descending artery and diagonal.  He continued to have an atypical chest pain.  He also had problems tolerating  various medications, including shortness of breath with Brilinta, so we switched to Effient.  He had problems with cold feet and lightheadedness, so we backed off on his beta blockers.     He has also seen my partner Augustine Hernández for his leg pain possible claudication, which was treated primarily by decreasing his metoprolol further.     He has essentially quit smoking, although still has an occasional cigarette.  He thinks the last time he has had one was about a month ago.  In the wintertime, he does occasionally have a cigarette if it is snowing when he is driving.  He states it calms his nerves, but otherwise is not smoking at all.  He states occasionally he will have 1 at a bar, and as stated, he thinks his last one was a month ago.     Ander states he is not having any chest, arm, neck, jaw or shoulder discomfort.  No dyspnea on exertion, orthopnea or PND.  No palpitations, lightheadedness, dizziness, syncope or near syncope.     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  He was able to exercise 6-1/2 minutes without symptoms.  Nuclear scan demonstrated fixed inferior defect without evidence of ischemia ejection fraction of 70%.  They typically require stress test every other year.     Assessment and plan.  Ander appears to be doing well from a cardiac standpoint without clinical evidence of ischemia.  He has no symptoms to suggest heart failure or significant arrhythmia.     2023 he had lost 20 pounds of weight because his sugars were high and he gave up his Mountain Dew.  He unfortunately is not exercising as noted above.  Reports he is gained back about 8 pounds but is still net down.  I have emphasized importance of a regular exercise regiment, low carbohydrate diet and maintaining ideal body weight.     Blood pressure is well controlled.       Fasting lipid profile is outstanding with total cholesterol of 89, HDL 32,  LDL 32 and triglycerides of 124.  We looked at the progression of triglycerides over the last 3 years dropping from [phone].  Illustrating what weight loss and cutting down on carbohydrates does to his triglycerides    My plan is to follow-up in 1 year if he should have any problems I be glad to see him sooner.  Thank you for allowing me to participate in his care.  Sincerely     Claudio Palmer MD [facility]    Today's clinic visit entailed:  Review of the result(s) of each unique test - lab work  Ordering of each unique test  Prescription drug management  32 minutes spent by me on the date of the encounter doing chart review, history and exam, documentation and further activities per the note  Provider  Link to [facility] Help Grid           Orders Placed This Encounter   Procedures    Basic metabolic panel    Lipid Profile    ALT    Follow-Up with Cardiology       Orders Placed This Encounter   Medications    rosuvastatin (CRESTOR) 40 MG tablet     Sig: Take 1 tablet (40 mg) by mouth daily     Dispense:  90 tablet     Refill:  3    nitroGLYcerin (NITROSTAT) 0.4 MG sublingual tablet     Sig: Place 1 tablet (0.4 mg) under the tongue every 5 minutes as needed for chest pain.     Dispense:  25 tablet     Refill:  0    metoprolol succinate ER (TOPROL XL) 25 MG 24 hr tablet     Sig: Take 1 tablet (25 mg) by mouth daily     Dispense:  90 tablet     Refill:  3       Medications Discontinued During This Encounter   Medication Reason    nitroGLYcerin (NITROSTAT) 0.4 MG sublingual tablet Reorder (No AVS)    metoprolol succinate ER (TOPROL XL) 25 MG 24 hr tablet Reorder (No AVS)    rosuvastatin (CRESTOR) 40 MG tablet          Encounter Diagnoses   Name Primary?    Coronary artery disease involving native coronary artery of native heart without angina pectoris     Mixed hyperlipidemia     Benign essential hypertension     Tobacco abuse Yes    Type 2 diabetes mellitus without complication, without long-term current use of insulin  (H)     Metabolic syndrome X     HDL deficiency        CURRENT MEDICATIONS:  Current Outpatient Medications   Medication Sig Dispense Refill    aspirin 81 MG chewable tablet Take 1 tablet (81 mg) by mouth daily 36 tablet 0    metFORMIN (GLUCOPHAGE) 500 MG tablet Take 1 tablet by mouth 2 times daily (with meals)      metoprolol succinate ER (TOPROL XL) 25 MG 24 hr tablet Take 1 tablet (25 mg) by mouth daily 90 tablet 3    nitroGLYcerin (NITROSTAT) 0.4 MG sublingual tablet Place 1 tablet (0.4 mg) under the tongue every 5 minutes as needed for chest pain. 25 tablet 0    rosuvastatin (CRESTOR) 40 MG tablet Take 1 tablet (40 mg) by mouth daily 90 tablet 3       ALLERGIES   No Known Allergies    PAST MEDICAL HISTORY:  Past Medical History:   Diagnosis Date    Angina pectoris (H24)     Benign essential hypertension     CAD (coronary artery disease) 3/2015    cardiac cath 3/10/15: SHARONDA to LAD, SHARONDA to 1st diagonal, cardiac cath 3/7/15: BMS x2 to RCA, cardiac cath 3/6/15: SHARONDA to RCA, SHARONDA to circumflex, SHARONDA to OM    Hyperlipidemia LDL goal <70     STEMI (ST elevation myocardial infarction) (H)     inferior March 2015       PAST SURGICAL HISTORY:  Past Surgical History:   Procedure Laterality Date    HEART CATH STENT COR W/WO PTCA  3/6/2015    aspiration thrombectomy and PCI with BMS in prox and mid RCA, 80% LAD, CFX stents patent (prox CFX and OM branch)    HEART CATH STENT COR W/WO PTCA  3/7/2015    SHARONDA 1st diagonal and SHARONDA mid LAD       FAMILY HISTORY:  Family History   Problem Relation Age of Onset    Arrhythmia Father        SOCIAL HISTORY:  Social History     Socioeconomic History    Marital status: Single     Spouse name: None    Number of children: None    Years of education: None    Highest education level: None   Tobacco Use    Smoking status: Some Days     Packs/day: 0.50     Years: 30.00     Additional pack years: 0.00     Total pack years: 15.00     Types: Cigarettes    Smokeless tobacco: Never    Tobacco comments:  "    6-8 months since last cigarette 3/31/21; quit 3/6/15 -  occas. has one  -very seldom   Substance and Sexual Activity    Alcohol use: Yes     Alcohol/week: 0.0 standard drinks of alcohol     Comment: rare    Drug use: No   Other Topics Concern    Caffeine Concern No     Comment: 1 can of pop daily    Sleep Concern No     Comment: sometimes    Stress Concern No    Weight Concern No    Special Diet No     Comment: more lean meats, more greens    Exercise No       Review of Systems:  Skin:  Negative       Eyes:  Negative      ENT:  Negative      Respiratory:  Positive for dyspnea on exertion     Cardiovascular:    Positive for;chest pain energy is a little lower  Gastroenterology: Negative      Genitourinary:  Negative      Musculoskeletal:  Negative      Neurologic:  Positive for numbness or tingling of feet    Psychiatric:  Positive for sleep disturbances Job related sleep disturbances  Heme/Lymph/Imm:  Positive for night sweats head sweats at night since his heart attack  Endocrine:  Positive for diabetes      Physical Exam:  Vitals: /82   Pulse 72   Ht 1.854 m (6' 1\")   Wt 90.6 kg (199 lb 12.8 oz)   BMI 26.36 kg/m      Constitutional:  cooperative, alert and oriented, well developed, well nourished, in no acute distress thin      Skin:  warm and dry to the touch, no apparent skin lesions or masses noted          Head:  normocephalic, no masses or lesions        Eyes:  pupils equal and round, conjunctivae and lids unremarkable, sclera white, no xanthalasma, EOMS intact, no nystagmus        Lymph:      ENT:  no pallor or cyanosis, dentition good        Neck:  JVP normal left carotid bruit      Respiratory:  normal breath sounds, clear to auscultation, normal A-P diameter, normal symmetry, normal respiratory excursion, no use of accessory muscles         Cardiac: regular rhythm             RRR without pathological murmur    pulses below the femoral arteries are diminished                                 " 2+ bilateral radial pulses distal to catheter insertion site, quick capillary refill    GI:      benign    Extremities and Muscular Skeletal:  no edema;no spinal abnormalities noted;normal muscle strength and tone         no edema    Neurological:  no gross motor deficits   motor grossly intact    Psych:  affect appropriate, oriented to time, person and place        CC  Claudio Palmer MD  [address]  [place] [address]      Thank you for allowing me to participate in the care of your patient.      Sincerely,     Claudio Palmer MD     [facility]

## 2024-03-06 NOTE — PROGRESS NOTES
HPI and Plan:   Ander is a very nice 58-year-old  who I first met in 03/2015 when he presented with an inferior wall myocardial infarction.  At that time he was smoking 1-1/2 packs of cigarettes per day.  He has hypertension, mixed hypercholesterolemia with HDL deficiency, marked hypertriglyceridemia and diabetes mellitus.     I took him to the Cath Lab, where I stented  his severely diseased right coronary artery.  He had disease in his circumflex and left anterior descending artery.  He had distal embolization of clot.  I performed aspiration thrombectomy and left him with some distal emboli in his posterolateral branches.  He initially had no-reflow phenomenon, which responded to medications.  I then proceeded to intervene on the circumflex coronary artery and obtuse marginal, leaving him with his 80% mid-LAD stenosis and 80% stenosis in the first diagonal with a plan to return in a staged fashion.     That night, however, despite being on Integrilin and Brilinta, he developed stent thrombosis and was taken back to the Cath Lab by my partner, where his right coronary artery was further dilated, additional stent was placed with an apparent good result.  He continued to flounder. Thinking that his symptoms were due to his left anterior descending artery, we brought him back to the Cath Lab, where again his right coronary artery was found to be occluded.  After review of the films and discussion amongst various interventionalists, we decided not to make any further attempts on opening his right coronary artery and proceeded with stenting of his left anterior descending artery and diagonal.  He continued to have an atypical chest pain.  He also had problems tolerating various medications, including shortness of breath with Brilinta, so we switched to Effient.  He had problems with cold feet and lightheadedness, so we backed off on his beta blockers.     He has also seen my partner Augustine Hernández for his leg  pain possible claudication, which was treated primarily by decreasing his metoprolol further.     He has essentially quit smoking, although still has an occasional cigarette.  He thinks the last time he has had one was about a month ago.  In the wintertime, he does occasionally have a cigarette if it is snowing when he is driving.  He states it calms his nerves, but otherwise is not smoking at all.  He states occasionally he will have 1 at a bar, and as stated, he thinks his last one was a month ago.     Ander states he is not having any chest, arm, neck, jaw or shoulder discomfort.  No dyspnea on exertion, orthopnea or PND.  No palpitations, lightheadedness, dizziness, syncope or near syncope.     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  He was able to exercise 6-1/2 minutes without symptoms.  Nuclear scan demonstrated fixed inferior defect without evidence of ischemia ejection fraction of 70%.  They typically require stress test every other year.     Assessment and plan.  Ander appears to be doing well from a cardiac standpoint without clinical evidence of ischemia.  He has no symptoms to suggest heart failure or significant arrhythmia.     2023 he had lost 20 pounds of weight because his sugars were high and he gave up his Mountain Dew.  He unfortunately is not exercising as noted above.  Reports he is gained back about 8 pounds but is still net down.  I have emphasized importance of a regular exercise regiment, low carbohydrate diet and maintaining ideal body weight.     Blood pressure is well controlled.       Fasting lipid profile is outstanding with total cholesterol of 89, HDL 32, LDL 32 and triglycerides of 124.  We looked at the progression of triglycerides over the last 3 years dropping from [phone].  Illustrating what weight loss and cutting down on carbohydrates does to his triglycerides    My plan is to  follow-up in 1 year if he should have any problems I be glad to see him sooner.  Thank you for allowing me to participate in his care.  Sincerely     Claudio Palmer MD [facility]    Today's clinic visit entailed:  Review of the result(s) of each unique test - lab work  Ordering of each unique test  Prescription drug management  32 minutes spent by me on the date of the encounter doing chart review, history and exam, documentation and further activities per the note  Provider  Link to [facility] Help Grid           Orders Placed This Encounter   Procedures    Basic metabolic panel    Lipid Profile    ALT    Follow-Up with Cardiology       Orders Placed This Encounter   Medications    rosuvastatin (CRESTOR) 40 MG tablet     Sig: Take 1 tablet (40 mg) by mouth daily     Dispense:  90 tablet     Refill:  3    nitroGLYcerin (NITROSTAT) 0.4 MG sublingual tablet     Sig: Place 1 tablet (0.4 mg) under the tongue every 5 minutes as needed for chest pain.     Dispense:  25 tablet     Refill:  0    metoprolol succinate ER (TOPROL XL) 25 MG 24 hr tablet     Sig: Take 1 tablet (25 mg) by mouth daily     Dispense:  90 tablet     Refill:  3       Medications Discontinued During This Encounter   Medication Reason    nitroGLYcerin (NITROSTAT) 0.4 MG sublingual tablet Reorder (No AVS)    metoprolol succinate ER (TOPROL XL) 25 MG 24 hr tablet Reorder (No AVS)    rosuvastatin (CRESTOR) 40 MG tablet          Encounter Diagnoses   Name Primary?    Coronary artery disease involving native coronary artery of native heart without angina pectoris     Mixed hyperlipidemia     Benign essential hypertension     Tobacco abuse Yes    Type 2 diabetes mellitus without complication, without long-term current use of insulin (H)     Metabolic syndrome X     HDL deficiency        CURRENT MEDICATIONS:  Current Outpatient Medications   Medication Sig Dispense Refill    aspirin 81 MG chewable tablet Take 1 tablet (81 mg) by mouth daily 36 tablet 0    metFORMIN  (GLUCOPHAGE) 500 MG tablet Take 1 tablet by mouth 2 times daily (with meals)      metoprolol succinate ER (TOPROL XL) 25 MG 24 hr tablet Take 1 tablet (25 mg) by mouth daily 90 tablet 3    nitroGLYcerin (NITROSTAT) 0.4 MG sublingual tablet Place 1 tablet (0.4 mg) under the tongue every 5 minutes as needed for chest pain. 25 tablet 0    rosuvastatin (CRESTOR) 40 MG tablet Take 1 tablet (40 mg) by mouth daily 90 tablet 3       ALLERGIES   No Known Allergies    PAST MEDICAL HISTORY:  Past Medical History:   Diagnosis Date    Angina pectoris (H24)     Benign essential hypertension     CAD (coronary artery disease) 3/2015    cardiac cath 3/10/15: SHARONDA to LAD, SHARONDA to 1st diagonal, cardiac cath 3/7/15: BMS x2 to RCA, cardiac cath 3/6/15: SHARONDA to RCA, SHARONDA to circumflex, SHARONDA to OM    Hyperlipidemia LDL goal <70     STEMI (ST elevation myocardial infarction) (H)     inferior March 2015       PAST SURGICAL HISTORY:  Past Surgical History:   Procedure Laterality Date    HEART CATH STENT COR W/WO PTCA  3/6/2015    aspiration thrombectomy and PCI with BMS in prox and mid RCA, 80% LAD, CFX stents patent (prox CFX and OM branch)    HEART CATH STENT COR W/WO PTCA  3/7/2015    SHARONDA 1st diagonal and SHARONDA mid LAD       FAMILY HISTORY:  Family History   Problem Relation Age of Onset    Arrhythmia Father        SOCIAL HISTORY:  Social History     Socioeconomic History    Marital status: Single     Spouse name: None    Number of children: None    Years of education: None    Highest education level: None   Tobacco Use    Smoking status: Some Days     Packs/day: 0.50     Years: 30.00     Additional pack years: 0.00     Total pack years: 15.00     Types: Cigarettes    Smokeless tobacco: Never    Tobacco comments:     6-8 months since last cigarette 3/31/21; quit 3/6/15 -  occas. has one  -very seldom   Substance and Sexual Activity    Alcohol use: Yes     Alcohol/week: 0.0 standard drinks of alcohol     Comment: rare    Drug use: No   Other  "Topics Concern    Caffeine Concern No     Comment: 1 can of pop daily    Sleep Concern No     Comment: sometimes    Stress Concern No    Weight Concern No    Special Diet No     Comment: more lean meats, more greens    Exercise No       Review of Systems:  Skin:  Negative       Eyes:  Negative      ENT:  Negative      Respiratory:  Positive for dyspnea on exertion     Cardiovascular:    Positive for;chest pain energy is a little lower  Gastroenterology: Negative      Genitourinary:  Negative      Musculoskeletal:  Negative      Neurologic:  Positive for numbness or tingling of feet    Psychiatric:  Positive for sleep disturbances Job related sleep disturbances  Heme/Lymph/Imm:  Positive for night sweats head sweats at night since his heart attack  Endocrine:  Positive for diabetes      Physical Exam:  Vitals: /82   Pulse 72   Ht 1.854 m (6' 1\")   Wt 90.6 kg (199 lb 12.8 oz)   BMI 26.36 kg/m      Constitutional:  cooperative, alert and oriented, well developed, well nourished, in no acute distress thin      Skin:  warm and dry to the touch, no apparent skin lesions or masses noted          Head:  normocephalic, no masses or lesions        Eyes:  pupils equal and round, conjunctivae and lids unremarkable, sclera white, no xanthalasma, EOMS intact, no nystagmus        Lymph:      ENT:  no pallor or cyanosis, dentition good        Neck:  JVP normal left carotid bruit      Respiratory:  normal breath sounds, clear to auscultation, normal A-P diameter, normal symmetry, normal respiratory excursion, no use of accessory muscles         Cardiac: regular rhythm             RRR without pathological murmur    pulses below the femoral arteries are diminished                                 2+ bilateral radial pulses distal to catheter insertion site, quick capillary refill    GI:      benign    Extremities and Muscular Skeletal:  no edema;no spinal abnormalities noted;normal muscle strength and tone         no " edema    Neurological:  no gross motor deficits   motor grossly intact    Psych:  affect appropriate, oriented to time, person and place        CC  Claudio Palmer MD  [address]  [place] [id number]

## 2025-02-05 ENCOUNTER — TELEPHONE (OUTPATIENT)
Dept: CARDIOLOGY | Facility: CLINIC | Age: 60
End: 2025-02-05
Payer: COMMERCIAL

## 2025-02-05 DIAGNOSIS — Z02.4 ENCOUNTER FOR DEPARTMENT OF TRANSPORTATION (DOT) EXAMINATION FOR DRIVING LICENSE RENEWAL: ICD-10-CM

## 2025-02-05 DIAGNOSIS — I21.11 ST ELEVATION MYOCARDIAL INFARCTION INVOLVING RIGHT CORONARY ARTERY (H): ICD-10-CM

## 2025-02-05 DIAGNOSIS — I25.10 CORONARY ARTERY DISEASE INVOLVING NATIVE CORONARY ARTERY OF NATIVE HEART WITHOUT ANGINA PECTORIS: Primary | ICD-10-CM

## 2025-02-05 NOTE — TELEPHONE ENCOUNTER
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 Palmer due around March 2025. No order for stress test.    Dr. Palmer messaged if okay to order along with if he would like to work patient into his schedule vs establish with another provider?

## 2025-02-06 NOTE — TELEPHONE ENCOUNTER
Claudio Palmer MD Ashenmacher, Megan, RN16 hours ago (5:31 PM)     Go ahead and order stress test.  If I have any urgent care slots you can put him into 1 of those.  Otherwise he has seen Augustine Hernández in the past       Spoke to patient, he says he is not sure what exercise test the DOT requires but can order the same one he did last time. NM exercise order placed. Scheduling messaged to arrange.

## 2025-03-04 ENCOUNTER — LAB (OUTPATIENT)
Dept: LAB | Facility: CLINIC | Age: 60
End: 2025-03-04
Payer: COMMERCIAL

## 2025-03-04 DIAGNOSIS — I25.10 CORONARY ARTERY DISEASE INVOLVING NATIVE CORONARY ARTERY OF NATIVE HEART WITHOUT ANGINA PECTORIS: Chronic | ICD-10-CM

## 2025-03-04 LAB
ALT SERPL W P-5'-P-CCNC: 22 U/L (ref 0–70)
ANION GAP SERPL CALCULATED.3IONS-SCNC: 9 MMOL/L (ref 7–15)
BUN SERPL-MCNC: 10.6 MG/DL (ref 8–23)
CALCIUM SERPL-MCNC: 9.4 MG/DL (ref 8.8–10.4)
CHLORIDE SERPL-SCNC: 103 MMOL/L (ref 98–107)
CHOLEST SERPL-MCNC: 89 MG/DL
CREAT SERPL-MCNC: 0.92 MG/DL (ref 0.67–1.17)
EGFRCR SERPLBLD CKD-EPI 2021: >90 ML/MIN/1.73M2
FASTING STATUS PATIENT QL REPORTED: YES
GLUCOSE SERPL-MCNC: 124 MG/DL (ref 70–99)
HCO3 SERPL-SCNC: 28 MMOL/L (ref 22–29)
HDLC SERPL-MCNC: 33 MG/DL
LDLC SERPL CALC-MCNC: 22 MG/DL
NONHDLC SERPL-MCNC: 56 MG/DL
POTASSIUM SERPL-SCNC: 5 MMOL/L (ref 3.4–5.3)
SODIUM SERPL-SCNC: 140 MMOL/L (ref 135–145)
TRIGL SERPL-MCNC: 172 MG/DL

## 2025-03-04 PROCEDURE — 36415 COLL VENOUS BLD VENIPUNCTURE: CPT

## 2025-03-04 PROCEDURE — 84460 ALANINE AMINO (ALT) (SGPT): CPT

## 2025-03-04 PROCEDURE — 80048 BASIC METABOLIC PNL TOTAL CA: CPT

## 2025-03-04 PROCEDURE — 80061 LIPID PANEL: CPT

## 2025-03-04 PROCEDURE — 82435 ASSAY OF BLOOD CHLORIDE: CPT

## 2025-03-04 PROCEDURE — 82465 ASSAY BLD/SERUM CHOLESTEROL: CPT

## 2025-03-05 ENCOUNTER — HOSPITAL ENCOUNTER (OUTPATIENT)
Dept: NUCLEAR MEDICINE | Facility: CLINIC | Age: 60
Setting detail: NUCLEAR MEDICINE
Discharge: HOME | End: 2025-03-05
Attending: INTERNAL MEDICINE
Payer: COMMERCIAL

## 2025-03-05 ENCOUNTER — HOSPITAL ENCOUNTER (OUTPATIENT)
Dept: CARDIOLOGY | Facility: CLINIC | Age: 60
Setting detail: NUCLEAR MEDICINE
Discharge: HOME | End: 2025-03-05
Attending: INTERNAL MEDICINE
Payer: COMMERCIAL

## 2025-03-05 DIAGNOSIS — Z02.4 ENCOUNTER FOR DEPARTMENT OF TRANSPORTATION (DOT) EXAMINATION FOR DRIVING LICENSE RENEWAL: ICD-10-CM

## 2025-03-05 DIAGNOSIS — I21.11 ST ELEVATION MYOCARDIAL INFARCTION INVOLVING RIGHT CORONARY ARTERY (H): ICD-10-CM

## 2025-03-05 DIAGNOSIS — I25.10 CORONARY ARTERY DISEASE INVOLVING NATIVE CORONARY ARTERY OF NATIVE HEART WITHOUT ANGINA PECTORIS: ICD-10-CM

## 2025-03-05 LAB
CV STRESS MAX HR HE: 141
NUC STRESS EJECTION FRACTION: 67 %
RATE PRESSURE PRODUCT: NORMAL
STRESS ANGINA INDEX: 0
STRESS ECHO BASELINE DIASTOLIC HE: 68
STRESS ECHO BASELINE HR: 93 BPM
STRESS ECHO BASELINE SYSTOLIC BP: 108
STRESS ECHO CALCULATED PERCENT HR: 89 %
STRESS ECHO LAST STRESS DIASTOLIC BP: 62
STRESS ECHO LAST STRESS SYSTOLIC BP: 160
STRESS ECHO POST ESTIMATED WORKLOAD: 10.7 METS
STRESS ECHO POST EXERCISE DUR MIN: 9 MIN
STRESS ECHO POST EXERCISE DUR SEC: 25 SEC
STRESS ECHO TARGET HR: 159

## 2025-03-05 PROCEDURE — 343N000001 HC RX 343 MED OP 636: Performed by: INTERNAL MEDICINE

## 2025-03-05 PROCEDURE — 93017 CV STRESS TEST TRACING ONLY: CPT

## 2025-03-05 PROCEDURE — A9500 TC99M SESTAMIBI: HCPCS | Performed by: INTERNAL MEDICINE

## 2025-03-05 PROCEDURE — 78452 HT MUSCLE IMAGE SPECT MULT: CPT

## 2025-03-05 RX ADMIN — Medication 10.8 MILLICURIE: at 07:58

## 2025-03-05 RX ADMIN — Medication 33 MILLICURIE: at 09:12

## 2025-03-12 ENCOUNTER — DOCUMENTATION ONLY (OUTPATIENT)
Dept: CARDIOLOGY | Facility: CLINIC | Age: 60
End: 2025-03-12
Payer: COMMERCIAL

## 2025-03-12 ENCOUNTER — TELEPHONE (OUTPATIENT)
Dept: CARDIOLOGY | Facility: CLINIC | Age: 60
End: 2025-03-12
Payer: COMMERCIAL

## 2025-03-12 NOTE — TELEPHONE ENCOUNTER
Patient called and left a message asking that his stress test/notes be sent to the DOT at [phone]. Message to in-clinic RN to send. Updated patient that this would be done.    MED

## 2025-03-24 ENCOUNTER — HOSPITAL ENCOUNTER (EMERGENCY)
Facility: CLINIC | Age: 60
Discharge: HOME | End: 2025-03-24
Attending: STUDENT IN AN ORGANIZED HEALTH CARE EDUCATION/TRAINING PROGRAM | Admitting: STUDENT IN AN ORGANIZED HEALTH CARE EDUCATION/TRAINING PROGRAM
Payer: COMMERCIAL

## 2025-03-24 VITALS
SYSTOLIC BLOOD PRESSURE: 142 MMHG | TEMPERATURE: 97 F | WEIGHT: 192.68 LBS | HEART RATE: 68 BPM | DIASTOLIC BLOOD PRESSURE: 104 MMHG | BODY MASS INDEX: 25.54 KG/M2 | RESPIRATION RATE: 18 BRPM | OXYGEN SATURATION: 98 % | HEIGHT: 73 IN

## 2025-03-24 DIAGNOSIS — R42 DIZZINESS: ICD-10-CM

## 2025-03-24 LAB
ALBUMIN SERPL BCG-MCNC: 4.6 G/DL (ref 3.5–5.2)
ALP SERPL-CCNC: 36 U/L (ref 40–150)
ALT SERPL W P-5'-P-CCNC: 22 U/L (ref 0–70)
ANION GAP SERPL CALCULATED.3IONS-SCNC: 12 MMOL/L (ref 7–15)
AST SERPL W P-5'-P-CCNC: 19 U/L (ref 0–45)
ATRIAL RATE - MUSE: 69 BPM
BASOPHILS # BLD AUTO: 0.1 10E3/UL (ref 0–0.2)
BASOPHILS NFR BLD AUTO: 1 %
BILIRUB SERPL-MCNC: 0.4 MG/DL
BUN SERPL-MCNC: 7.5 MG/DL (ref 8–23)
CALCIUM SERPL-MCNC: 9.5 MG/DL (ref 8.8–10.4)
CHLORIDE SERPL-SCNC: 99 MMOL/L (ref 98–107)
CREAT SERPL-MCNC: 0.91 MG/DL (ref 0.67–1.17)
D DIMER PPP FEU-MCNC: <0.27 UG/ML FEU (ref 0–0.5)
DIASTOLIC BLOOD PRESSURE - MUSE: NORMAL MMHG
EGFRCR SERPLBLD CKD-EPI 2021: >90 ML/MIN/1.73M2
EOSINOPHIL # BLD AUTO: 0.3 10E3/UL (ref 0–0.7)
EOSINOPHIL NFR BLD AUTO: 3 %
ERYTHROCYTE [DISTWIDTH] IN BLOOD BY AUTOMATED COUNT: 12.2 % (ref 10–15)
GLUCOSE SERPL-MCNC: 110 MG/DL (ref 70–99)
HCO3 SERPL-SCNC: 26 MMOL/L (ref 22–29)
HCT VFR BLD AUTO: 47.2 % (ref 40–53)
HGB BLD-MCNC: 16.3 G/DL (ref 13.3–17.7)
HOLD SPECIMEN: NORMAL
HOLD SPECIMEN: NORMAL
IMM GRANULOCYTES # BLD: 0 10E3/UL
IMM GRANULOCYTES NFR BLD: 0 %
INTERPRETATION ECG - MUSE: NORMAL
LYMPHOCYTES # BLD AUTO: 3 10E3/UL (ref 0.8–5.3)
LYMPHOCYTES NFR BLD AUTO: 29 %
MCH RBC QN AUTO: 31.1 PG (ref 26.5–33)
MCHC RBC AUTO-ENTMCNC: 34.5 G/DL (ref 31.5–36.5)
MCV RBC AUTO: 90 FL (ref 78–100)
MONOCYTES # BLD AUTO: 0.9 10E3/UL (ref 0–1.3)
MONOCYTES NFR BLD AUTO: 9 %
NEUTROPHILS # BLD AUTO: 6.1 10E3/UL (ref 1.6–8.3)
NEUTROPHILS NFR BLD AUTO: 58 %
NRBC # BLD AUTO: 0 10E3/UL
NRBC BLD AUTO-RTO: 0 /100
P AXIS - MUSE: 69 DEGREES
PLATELET # BLD AUTO: 219 10E3/UL (ref 150–450)
POTASSIUM SERPL-SCNC: 4 MMOL/L (ref 3.4–5.3)
PR INTERVAL - MUSE: 196 MS
PROT SERPL-MCNC: 7.5 G/DL (ref 6.4–8.3)
QRS DURATION - MUSE: 110 MS
QT - MUSE: 412 MS
QTC - MUSE: 441 MS
R AXIS - MUSE: 70 DEGREES
RBC # BLD AUTO: 5.24 10E6/UL (ref 4.4–5.9)
SODIUM SERPL-SCNC: 137 MMOL/L (ref 135–145)
SYSTOLIC BLOOD PRESSURE - MUSE: NORMAL MMHG
T AXIS - MUSE: 81 DEGREES
TROPONIN T SERPL HS-MCNC: 18 NG/L
VENTRICULAR RATE- MUSE: 69 BPM
WBC # BLD AUTO: 10.4 10E3/UL (ref 4–11)

## 2025-03-24 PROCEDURE — 93005 ELECTROCARDIOGRAM TRACING: CPT

## 2025-03-24 PROCEDURE — 85379 FIBRIN DEGRADATION QUANT: CPT | Performed by: STUDENT IN AN ORGANIZED HEALTH CARE EDUCATION/TRAINING PROGRAM

## 2025-03-24 PROCEDURE — 36415 COLL VENOUS BLD VENIPUNCTURE: CPT | Performed by: EMERGENCY MEDICINE

## 2025-03-24 PROCEDURE — 99284 EMERGENCY DEPT VISIT MOD MDM: CPT

## 2025-03-24 PROCEDURE — 85025 COMPLETE CBC W/AUTO DIFF WBC: CPT | Performed by: EMERGENCY MEDICINE

## 2025-03-24 PROCEDURE — 80053 COMPREHEN METABOLIC PANEL: CPT | Performed by: EMERGENCY MEDICINE

## 2025-03-24 PROCEDURE — 84484 ASSAY OF TROPONIN QUANT: CPT | Performed by: EMERGENCY MEDICINE

## 2025-03-24 ASSESSMENT — ACTIVITIES OF DAILY LIVING (ADL)
ADLS_ACUITY_SCORE: 41
ADLS_ACUITY_SCORE: 41

## 2025-03-24 ASSESSMENT — COLUMBIA-SUICIDE SEVERITY RATING SCALE - C-SSRS
1. IN THE PAST MONTH, HAVE YOU WISHED YOU WERE DEAD OR WISHED YOU COULD GO TO SLEEP AND NOT WAKE UP?: NO
6. HAVE YOU EVER DONE ANYTHING, STARTED TO DO ANYTHING, OR PREPARED TO DO ANYTHING TO END YOUR LIFE?: NO
2. HAVE YOU ACTUALLY HAD ANY THOUGHTS OF KILLING YOURSELF IN THE PAST MONTH?: NO

## 2025-03-24 NOTE — DISCHARGE INSTRUCTIONS
Return to the emergency department if symptoms are worsening, become concerning, or for any other concerns. Follow-up with your doctor in 2-3 and sooner if needed.      Discharge Instructions  Dizziness (Lightheaded)  Today you were seen for dizziness.  Dizziness can be caused by many things and it can be very difficult to determine the cause of dizziness.  At this time, your provider has found no signs that your dizziness is due to a serious or life-threatening condition. However, sometimes there is a serious problem that does not show up right away, and it is important for you to follow up with your regular provider as instructed.  Generally, every Emergency Department visit should have a follow-up clinic visit with either a primary or a specialty clinic/provider. Please follow-up as instructed by your emergency provider today.      Return to the Emergency Department if:    You pass out (fainting or falling out), especially during exercise.    You develop chest pain, chest pressure or difficulty breathing.  Your feel an irregular heartbeat.  You have excessive vaginal bleeding, or blood in your stool or vomit (throw up).  You have a high fever.  Your symptoms get worse or more frequent.    If when you begin to feel dizzy or lightheaded, it is important to sit down or lay down immediately to prevent injury from falling.  If you were given a prescription for medicine here today, be sure to read all of the information (including the package insert) that comes with your prescription.  This will include important information about the medicine, its side effects, and any warnings that you need to know about.  The pharmacist who fills the prescription can provide more information and answer questions you may have about the medicine.  If you have questions or concerns that the pharmacist cannot address, please call or return to the Emergency Department.   Remember that you can always come back to the Emergency Department  if you are not able to see your regular provider in the amount of time listed above, if you get any new symptoms, or if there is anything that worries you.

## 2025-03-24 NOTE — ED PROVIDER NOTES
"  Emergency Department Note      History of Present Illness     Chief Complaint   Dizziness      HPI   Ander Doyle is a 59 year old male with a history of hypertension, CAD, type 2 diabetes, tobacco abuse, hypertension, and hyperlipidemia who presents to the ED for evaluation of dizziness. The patient states he has had intermittent dizziness for the past few weeks with his last episode last night which lasted longer than past episodes. These episodes are accompanied by brief diffuse paresthesias and mild headaches. They last 30 min to a few hours and are not worse with position. Endorses mild intermittent shortness of breath and rhinorrhea. None here. Describes this dizziness as similar to being \"buzzed\" with alcohol, not room-spinning. He has been seen by a Cardiologist for this where a stress test was done. He was directed to stop taking his metoprolol as it may have been contributing. States these episodes have continued despite this. He has been eating and drinking well but has lost 10 pounds in the last year without intending to. Reports infrequent alcohol and smoking. No other drug use. 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 Notes he has only been getting 3-4 hours of sleep per night. He is sedentary for 4-6 hours at a time for this. Denies hemoptysis, head or neck trauma, lower extremity edema or pain, recent surgery, vision changes, or chest pain. No history of DVT/PE. Patient is not on blood thinners.     Independent Historian   None    Review of External Notes   I reviewed the office visit note from 3/15/25 for fatigue.     Reviewed the Cardiology note from 3/7/25 discussing a stress test.     Past Medical History     Medical History and Problem List   GERD  HTN  CAD  Type 2 diabetes  Metabolic syndrome X  HLD  Tobacco abuse    Medications   Atenolol  Aspirin 81 mg   Metformin  Nitroglycerin  Crestor     Surgical History   Aspiration thrombectomy with PCI  Heart catheterization " "with stent placement     Physical Exam     Patient Vitals for the past 24 hrs:   BP Temp Temp src Pulse Resp SpO2 Height Weight   03/24/25 0800 (!) 142/104 -- -- 68 -- -- -- --   03/24/25 0746 (!) 145/101 -- -- 67 18 98 % -- --   03/24/25 0745 (!) 145/101 -- -- 67 -- -- -- --   03/24/25 0730 (!) 144/84 -- -- 66 -- -- -- --   03/24/25 0645 (!) 154/90 -- -- 67 -- -- -- --   03/24/25 0630 (!) 143/96 -- -- 71 -- 98 % -- --   03/24/25 0614 (!) 146/96 -- -- 69 -- 98 % -- --   03/24/25 0559 (!) 142/88 -- -- 67 -- 98 % -- --   03/24/25 0545 (!) 142/83 -- -- 64 -- 97 % -- --   03/24/25 0503 (!) 146/94 97  F (36.1  C) Temporal 72 20 99 % 1.854 m (6' 1\") 87.4 kg (192 lb 10.9 oz)     Physical Exam  GENERAL: Patient well-appearing  HEAD: Atraumatic.  EYES: Anicteric. PERRL  NOSE: No active bleeding  MOUTH: Moist mucosa  THROAT: Patent airway.   NECK: No rigidity  CV: RRR, no murmurs, rubs or gallops  PULM: CTAB with good aeration; no retractions, rales, rhonchi, or wheezing  ABD: Soft, nontender, nondistended, no guarding, no peritoneal signs   DERM: No rash. Skin warm and dry  EXTREMITY: Moving all extremities without difficulty. No calf tenderness or peripheral edema  VASCULAR: Symmetric pulses bilaterally  NEURO: Alert, answering questions appropriately. Speaks clearly. CN 2-12 fully intact. Strength 5/5 bilateral LE/UE. Sensation fully intact to light touch symmetrically bilateral LE/UE. Normal finger-to-nose and heel to shin. No nystagmus. No visual field cut. Normal gait assessment without ataxia.        Diagnostics     Lab Results   Labs Ordered and Resulted from Time of ED Arrival to Time of ED Departure   COMPREHENSIVE METABOLIC PANEL - Abnormal       Result Value    Sodium 137      Potassium 4.0      Carbon Dioxide (CO2) 26      Anion Gap 12      Urea Nitrogen 7.5 (*)     Creatinine 0.91      GFR Estimate >90      Calcium 9.5      Chloride 99      Glucose 110 (*)     Alkaline Phosphatase 36 (*)     AST 19      ALT 22   "    Protein Total 7.5      Albumin 4.6      Bilirubin Total 0.4     TROPONIN T, HIGH SENSITIVITY - Normal    Troponin T, High Sensitivity 18     D DIMER QUANTITATIVE - Normal    D-Dimer Quantitative <0.27     CBC WITH PLATELETS AND DIFFERENTIAL    WBC Count 10.4      RBC Count 5.24      Hemoglobin 16.3      Hematocrit 47.2      MCV 90      MCH 31.1      MCHC 34.5      RDW 12.2      Platelet Count 219      % Neutrophils 58      % Lymphocytes 29      % Monocytes 9      % Eosinophils 3      % Basophils 1      % Immature Granulocytes 0      NRBCs per 100 WBC 0      Absolute Neutrophils 6.1      Absolute Lymphocytes 3.0      Absolute Monocytes 0.9      Absolute Eosinophils 0.3      Absolute Basophils 0.1      Absolute Immature Granulocytes 0.0      Absolute NRBCs 0.0         Imaging   No orders to display       EKG   ECG taken at 0510, ECG read at 0525  Sinus rhythm with Premature atrial complexes   Incomplete right bundle branch block    No significant change as compared to prior, dated 3/5/25.  Rate 69 bpm. MN interval 196 ms. QRS duration 110 ms. QT/QTc 412/441 ms. P-R-T axes 69 70 81.    Independent Interpretation   None    ED Course      Medications Administered   Medications - No data to display    Procedures   Procedures     Discussion of Management   None    ED Course   ED Course as of 03/24/25 0813   Mon Mar 24, 2025   0626 I obtained history and performed a physical exam as noted above.    0755 I rechecked and updated the patient.        Additional Documentation  None    Medical Decision Making / Diagnosis     CMS Diagnoses: None    MIPS       None    [facility]   Ander Doyle is a 59 year old male presenting with intermittent sensations of not feeling well versus buzzing versus dizziness for weeks.  Initial vital signs not requiring acute intervention.  Unremarkable neurologic assessment.  ECG unchanged from baseline he just had a stress test and cardiology follow-up.  Troponin negative.  No chest pain or  shortness of breath.  Do not think we need to repeat.  D-dimer negative.  Do not suspect PE.  Other labs are reassuring.  I do not see an emergent indication to get advanced imaging of the brain - I see no evidence of stroke.  Patient has only been sleeping 3 to 4 hours at night for multiple nights, which I deafly think could be contributing to his symptoms as he currently feels well.  Recommended improved sleep hygiene.  Recommend he follow-up closely with his doctor.  Patient is already on a baby aspirin.  Do not think we need to add any additional medications.  Did discuss ensuring he has follow-up for potential outpatient chest x-ray as he does have a history of smoking-however no current shortness of breath or cough or fevers, so do not think we need to emergently.  All questions answered.  Discharged in stable condition.    Disposition   The patient was discharged.     Diagnosis     ICD-10-CM    1. Dizziness  R42            Discharge Medications   Discharge Medication List as of 3/24/2025  7:59 AM            Scribe Disclosure:  I, Estela Buitrago, am serving as a scribe at 6:15 AM on 3/24/2025 to document services personally performed by Yaniv Lopez MD based on my observations and the provider's statements to me.        Yaniv Lopez MD  03/24/25 [id number]

## 2025-04-07 ENCOUNTER — LAB (OUTPATIENT)
Dept: LAB | Facility: CLINIC | Age: 60
End: 2025-04-07
Payer: COMMERCIAL

## 2025-04-07 DIAGNOSIS — G25.81 RESTLESS LEGS SYNDROME (RLS): Primary | ICD-10-CM

## 2025-04-07 LAB
FERRITIN SERPL-MCNC: 95 NG/ML (ref 31–409)
IRON BINDING CAPACITY (ROCHE): 355 UG/DL (ref 240–430)
IRON SATN MFR SERPL: 19 % (ref 15–46)
IRON SERPL-MCNC: 66 UG/DL (ref 61–157)

## 2025-04-07 PROCEDURE — 36415 COLL VENOUS BLD VENIPUNCTURE: CPT

## 2025-04-07 PROCEDURE — 82728 ASSAY OF FERRITIN: CPT

## 2025-04-07 PROCEDURE — 83550 IRON BINDING TEST: CPT
